# Patient Record
Sex: MALE | Race: WHITE | NOT HISPANIC OR LATINO | Employment: PART TIME | URBAN - METROPOLITAN AREA
[De-identification: names, ages, dates, MRNs, and addresses within clinical notes are randomized per-mention and may not be internally consistent; named-entity substitution may affect disease eponyms.]

---

## 2017-03-06 ENCOUNTER — TELEPHONE (OUTPATIENT)
Dept: CARDIOLOGY | Facility: CLINIC | Age: 67
End: 2017-03-06

## 2017-03-06 NOTE — TELEPHONE ENCOUNTER
Patient is scheduled for left hand palmar fasciectomy on 3/16/17 under IV sedation & axillary block and Dr. Juan Raomn Alvarez is requesting your recommendation on whether patient should hold 81mg Aspirin, if so for how long.    Please advise.  Thanks!    [facility]  Dr. Juan Ramon Alvarez  FAX # [phone].

## 2017-10-04 ENCOUNTER — OFFICE VISIT (OUTPATIENT)
Dept: CARDIOLOGY | Facility: CLINIC | Age: 67
End: 2017-10-04

## 2017-10-04 VITALS
SYSTOLIC BLOOD PRESSURE: 134 MMHG | WEIGHT: 226 LBS | DIASTOLIC BLOOD PRESSURE: 80 MMHG | HEIGHT: 74 IN | BODY MASS INDEX: 29 KG/M2 | HEART RATE: 74 BPM

## 2017-10-04 DIAGNOSIS — I73.9 PAD (PERIPHERAL ARTERY DISEASE) (HCC): Primary | ICD-10-CM

## 2017-10-04 DIAGNOSIS — I65.29 OCCLUSION OF CAROTID ARTERY, UNSPECIFIED LATERALITY: ICD-10-CM

## 2017-10-04 PROCEDURE — 93000 ELECTROCARDIOGRAM COMPLETE: CPT | Performed by: INTERNAL MEDICINE

## 2017-10-04 PROCEDURE — 99213 OFFICE O/P EST LOW 20 MIN: CPT | Performed by: INTERNAL MEDICINE

## 2017-10-04 NOTE — PROGRESS NOTES
Subjective:     Encounter Date:10/04/2017      Patient ID: Kevon Orantes is a 67 y.o. male.    Chief Complaint: PAF, carotid artery occlusion    History of Present Illness    Dear Dr. Moody,     I had the pleasure of seeing your patient in cardiac followup today.  As you well know, he is a alfredo 67-year-old man with history of coronary and peripheral arterial disease.  He is status post atherectomy of his left SFA.  He has had an occlusion of his left carotid.      He comes in for his yearly followup.  Since I have last seen him, he states his golf game is the best it has ever been.  He is shooting in the 70s.  This is despite having hand surgery for a contracture.      He denies any symptoms of a stroke, angina or claudication.          Review of Systems   All other systems reviewed and are negative.        ECG 12 Lead  Date/Time: 10/4/2017 9:33 AM  Performed by: ANIA MCCLOUD  Authorized by: ANIA MCCLOUD   Comparison: compared with previous ECG   Similar to previous ECG  Rhythm: sinus rhythm  BPM: 74                 Objective:     Physical Exam   Constitutional: He is oriented to person, place, and time. He appears well-developed and well-nourished.   HENT:   Head: Normocephalic and atraumatic.   Neck: Normal range of motion. Neck supple.   Cardiovascular: Normal rate, regular rhythm and normal heart sounds.    Pulmonary/Chest: Effort normal and breath sounds normal.   Abdominal: Soft. Bowel sounds are normal.   Musculoskeletal: Normal range of motion.   Neurological: He is alert and oriented to person, place, and time.   Skin: Skin is warm and dry.   Psychiatric: He has a normal mood and affect. His behavior is normal. Thought content normal.   Vitals reviewed.      Lab Review:       Assessment:          Diagnosis Plan   1. PAD (peripheral artery disease)     2. Occlusion of carotid artery, unspecified laterality            Plan:       It was a pleasure to see your patient in cardiac followup today.  He is stable  from the cardiac standpoint without any complaints of angina.  His peripheral arterial disease is stable.  He has no symptoms of carotid disease.  He will see me again in one year or sooner if symptoms warrant.      Coronary Artery Disease  Assessment  • The patient has no angina    Subjective - Objective  • Current antiplatelet therapy includes aspirin 81 mg

## 2018-10-10 ENCOUNTER — OFFICE VISIT (OUTPATIENT)
Dept: CARDIOLOGY | Facility: CLINIC | Age: 68
End: 2018-10-10

## 2018-10-10 VITALS
WEIGHT: 230 LBS | HEART RATE: 72 BPM | BODY MASS INDEX: 29.52 KG/M2 | HEIGHT: 74 IN | SYSTOLIC BLOOD PRESSURE: 120 MMHG | DIASTOLIC BLOOD PRESSURE: 66 MMHG

## 2018-10-10 DIAGNOSIS — I73.9 PAD (PERIPHERAL ARTERY DISEASE) (HCC): Primary | ICD-10-CM

## 2018-10-10 DIAGNOSIS — I65.29 OCCLUSION OF CAROTID ARTERY, UNSPECIFIED LATERALITY: ICD-10-CM

## 2018-10-10 PROCEDURE — 99213 OFFICE O/P EST LOW 20 MIN: CPT | Performed by: INTERNAL MEDICINE

## 2018-10-10 PROCEDURE — 93000 ELECTROCARDIOGRAM COMPLETE: CPT | Performed by: INTERNAL MEDICINE

## 2018-10-10 NOTE — PROGRESS NOTES
Subjective:     Encounter Date:10/10/2018      Patient ID: Kevon Orantes is a 68 y.o. male.    Chief Complaint: PAD, carotid artery occlusion    History of Present Illness    Dear Suri Power,     I had the pleasure of seeing Kevon Orantes in cardiac followup today.  As you well know, he is a alfredo 68-year-old male with history of peripheral arterial disease.  He has had atherectomy of his left SFA.  He has carotid artery stenosis.  His left carotid artery is known to be occluded.      Since I have last seen him, he reports doing great.  He has no complaints.  He has traveled to [place] and the [facility].  He has no symptoms of claudication or critical limb ischemia.  He has no stroke symptoms.  His biggest complaint is that he is having trouble getting his gout medicine.          Review of Systems   All other systems reviewed and are negative.        ECG 12 Lead  Date/Time: 10/10/2018 9:00 AM  Performed by: ANIA MCCLOUD  Authorized by: ANIA MCCLOUD   Comparison: compared with previous ECG   Similar to previous ECG  Rhythm: sinus rhythm  BPM: 72  Clinical impression: normal ECG               Objective:     Physical Exam   Constitutional: He is oriented to person, place, and time. He appears well-developed and well-nourished.   HENT:   Head: Normocephalic and atraumatic.   Neck: Normal range of motion. Neck supple.   Cardiovascular: Normal rate, regular rhythm and normal heart sounds.    Pulmonary/Chest: Effort normal and breath sounds normal.   Abdominal: Soft. Bowel sounds are normal.   Musculoskeletal: Normal range of motion.   Neurological: He is alert and oriented to person, place, and time.   Skin: Skin is warm and dry.   Psychiatric: He has a normal mood and affect. His behavior is normal. Thought content normal.   Vitals reviewed.      Lab Review:       Assessment:          Diagnosis Plan   1. PAD (peripheral artery disease) (CMS/[facility])     2. Occlusion of carotid artery, unspecified laterality            Plan:        It was a pleasure to see your patient in cardiac followup today.  He is doing very well from the standpoint of his peripheral arterial disease and carotid occlusion.  He is on a good preventative regimen.  He has had no symptoms.  He will see me again in one year or sooner if symptoms warrant.

## 2018-10-10 NOTE — PROGRESS NOTES
Subjective:     Encounter Date:10/10/2018      Patient ID: Kevon Orantes is a 68 y.o. male.    Chief Complaint:  History of Present Illness    {Common H&P Review Areas:44658}    ROS      ECG 12 Lead  Date/Time: 10/10/2018 8:59 AM  Performed by: ANIA MCCLOUD  Authorized by: ANIA MCCLOUD                  Objective:     Physical Exam    Lab Review:       Assessment:         No diagnosis found.       Plan:

## 2019-05-28 ENCOUNTER — PREP FOR SURGERY (OUTPATIENT)
Dept: OTHER | Facility: HOSPITAL | Age: 69
End: 2019-05-28

## 2019-05-28 DIAGNOSIS — K63.5 POLYP OF TRANSVERSE COLON, UNSPECIFIED TYPE: Primary | ICD-10-CM

## 2019-06-18 PROBLEM — K63.5 POLYP OF TRANSVERSE COLON: Status: ACTIVE | Noted: 2019-06-18

## 2019-08-26 RX ORDER — ISOSORBIDE MONONITRATE 30 MG/1
30 TABLET, EXTENDED RELEASE ORAL EVERY EVENING
Status: ON HOLD | COMMUNITY
End: 2019-10-29

## 2019-08-27 ENCOUNTER — ANESTHESIA EVENT (OUTPATIENT)
Dept: GASTROENTEROLOGY | Facility: HOSPITAL | Age: 69
End: 2019-08-27

## 2019-08-27 ENCOUNTER — HOSPITAL ENCOUNTER (OUTPATIENT)
Facility: HOSPITAL | Age: 69
Setting detail: HOSPITAL OUTPATIENT SURGERY
Discharge: HOME | End: 2019-08-27
Attending: INTERNAL MEDICINE | Admitting: INTERNAL MEDICINE

## 2019-08-27 ENCOUNTER — ANESTHESIA (OUTPATIENT)
Dept: GASTROENTEROLOGY | Facility: HOSPITAL | Age: 69
End: 2019-08-27

## 2019-08-27 VITALS
OXYGEN SATURATION: 93 % | DIASTOLIC BLOOD PRESSURE: 72 MMHG | TEMPERATURE: 98.1 F | BODY MASS INDEX: 28.88 KG/M2 | WEIGHT: 225 LBS | RESPIRATION RATE: 18 BRPM | SYSTOLIC BLOOD PRESSURE: 107 MMHG | HEIGHT: 74 IN | HEART RATE: 77 BPM

## 2019-08-27 LAB — GLUCOSE BLDC GLUCOMTR-MCNC: 97 MG/DL (ref 70–130)

## 2019-08-27 PROCEDURE — 82962 GLUCOSE BLOOD TEST: CPT

## 2019-08-27 PROCEDURE — G0105 COLORECTAL SCRN; HI RISK IND: HCPCS | Performed by: INTERNAL MEDICINE

## 2019-08-27 PROCEDURE — 25010000002 PROPOFOL 10 MG/ML EMULSION: Performed by: NURSE ANESTHETIST, CERTIFIED REGISTERED

## 2019-08-27 PROCEDURE — S0260 H&P FOR SURGERY: HCPCS | Performed by: INTERNAL MEDICINE

## 2019-08-27 RX ORDER — PROPOFOL 10 MG/ML
VIAL (ML) INTRAVENOUS AS NEEDED
Status: DISCONTINUED | OUTPATIENT
Start: 2019-08-27 | End: 2019-08-27 | Stop reason: SURG

## 2019-08-27 RX ORDER — SODIUM CHLORIDE, SODIUM LACTATE, POTASSIUM CHLORIDE, CALCIUM CHLORIDE 600; 310; 30; 20 MG/100ML; MG/100ML; MG/100ML; MG/100ML
30 INJECTION, SOLUTION INTRAVENOUS CONTINUOUS PRN
Status: DISCONTINUED | OUTPATIENT
Start: 2019-08-27 | End: 2019-08-27 | Stop reason: HOSPADM

## 2019-08-27 RX ORDER — PROPOFOL 10 MG/ML
VIAL (ML) INTRAVENOUS CONTINUOUS PRN
Status: DISCONTINUED | OUTPATIENT
Start: 2019-08-27 | End: 2019-08-27 | Stop reason: SURG

## 2019-08-27 RX ADMIN — SODIUM CHLORIDE, POTASSIUM CHLORIDE, SODIUM LACTATE AND CALCIUM CHLORIDE 30 ML/HR: 600; 310; 30; 20 INJECTION, SOLUTION INTRAVENOUS at 12:41

## 2019-08-27 RX ADMIN — PROPOFOL 50 MG: 10 INJECTION, EMULSION INTRAVENOUS at 13:07

## 2019-08-27 RX ADMIN — PROPOFOL 250 MCG/KG/MIN: 10 INJECTION, EMULSION INTRAVENOUS at 13:07

## 2019-08-27 NOTE — ANESTHESIA PREPROCEDURE EVALUATION
Anesthesia Evaluation     Patient summary reviewed and Nursing notes reviewed   no history of anesthetic complications:  NPO Solid Status: > 8 hours  NPO Liquid Status: > 8 hours           Airway   Mallampati: II  TM distance: >3 FB  Neck ROM: full  No difficulty expected  Dental - normal exam     Pulmonary    (+) a smoker Former Abstained day of surgery,   (-) COPD, asthma, sleep apnea, rhonchi, decreased breath sounds, wheezes  Cardiovascular   Exercise tolerance: good (4-7 METS)    Rhythm: regular  Rate: normal    (+) hypertension, PVD, hyperlipidemia,  carotid artery disease  (-) CAD, dysrhythmias, angina, LAMBERT, murmur      Neuro/Psych  (+) CVA,     (-) seizures    ROS Comment: CVA 2008 resolved after 4 days has an occluded carotid pt not sure which side  GI/Hepatic/Renal/Endo    (+)   diabetes mellitus type 2,   (-) liver disease, no renal disease, hypothyroidism, hyperthyroidism    Musculoskeletal     Abdominal     Abdomen: soft.   Substance History      OB/GYN          Other                        Anesthesia Plan    ASA 3     MAC   total IV anesthesia  intravenous induction   Anesthetic plan, all risks, benefits, and alternatives have been provided, discussed and informed consent has been obtained with: patient.

## 2019-08-27 NOTE — ANESTHESIA POSTPROCEDURE EVALUATION
"Patient: Kevon Orantes    Procedure Summary     Date:  08/27/19 Room / Location:  [facility] ENDOSCOPY 4 /  [facility]    Anesthesia Start:  1253 Anesthesia Stop:  1323    Procedure:  COLONOSCOPY to cecum into TI (N/A ) Diagnosis:       Polyp of transverse colon, unspecified type      (Polyp of transverse colon, unspecified type [D12.3])    Surgeon:  Angel Luis Velasquez MD Provider:  Peña Ortega MD    Anesthesia Type:  MAC ASA Status:  3          Anesthesia Type: MAC  Last vitals  BP   99/66 (08/27/19 1334)   Temp   36.7 °C (98.1 °F) (08/27/19 1233)   Pulse   80 (08/27/19 1334)   Resp   18 (08/27/19 1334)     SpO2   95 % (08/27/19 1334)     Post Anesthesia Care and Evaluation    Patient location during evaluation: bedside  Patient participation: complete - patient participated  Level of consciousness: awake and alert  Pain management: adequate  Airway patency: patent  Anesthetic complications: No anesthetic complications    Cardiovascular status: acceptable  Respiratory status: acceptable  Hydration status: acceptable    Comments: BP 99/66 (BP Location: Left arm, Patient Position: Lying)   Pulse 80   Temp 36.7 °C (98.1 °F) (Oral)   Resp 18   Ht 188 cm (74\")   Wt 102 kg (225 lb)   SpO2 95%   BMI 28.89 kg/m²           "

## 2019-08-27 NOTE — H&P
[facility]  Pre Procedure History & Physical    Chief Complaint:   Time for my colonoscopy    Subjective     HPI:   68 y.o. 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)    • Arteriolosclerosis    • Arthritis     KNEES   • CAD (coronary artery disease)    • Carotid artery stenosis    • Diabetes mellitus (CMS/HCC)     TYPE 2   • Edema     lower extrremity   • H/O cerebral artery stenosis    • Health care maintenance    • Hyperlipidemia    • Hypertension    • Intermittent claudication (CMS/HCC)    • Peripheral vascular disease (CMS/HCC)    • PVD (peripheral vascular disease) (CMS/[facility])    • Stroke syndrome     2008         Family History:  Family History   Problem Relation Age of Onset   • Malig Hyperthermia Neg Hx        Social History:   reports that he quit smoking about 11 years ago. His smoking use included cigarettes. He has never used smokeless tobacco. He reports that he drinks alcohol. He reports that he does not use drugs.    Medications:   Medications Prior to Admission   Medication Sig Dispense Refill Last Dose   • aspirin 81 MG EC tablet Take 81 mg by mouth Every Other Day.   8/26/2019 at Unknown time   • isosorbide mononitrate (IMDUR) 30 MG 24 hr tablet Take 30 mg by mouth Every Evening.   8/26/2019 at Unknown time   • metFORMIN (GLUCOPHAGE) 500 MG tablet Take 500 mg by mouth 2 (Two) Times a Day With Meals.   8/27/2019 at Unknown time   • simvastatin (ZOCOR) 20 MG tablet Take 20 mg by mouth Every Night.   8/27/2019 at Unknown time   • triamterene-hydrochlorothiazide (DYAZIDE) 37.5-25 MG per capsule Take 1 capsule by mouth Every Morning.   8/27/2019 at Unknown time   • colchicine 0.6 MG tablet Take 0.6 mg by mouth As Needed.   More than a month at Unknown time   • isosorbide mononitrate (IMDUR) 30 MG 24 hr tablet take 1 tablet by mouth once daily 90 tablet 3 Taking   • nitroglycerin (NITROSTAT) 0.4 MG SL  "tablet Place 0.4 mg under the tongue Every 5 (Five) Minutes As Needed for chest pain. Take no more than 3 doses in 15 minutes.   Unknown at Unknown time       Allergies:  Patient has no known allergies.    ROS:    Pertinent items are noted in HPI     Objective     Blood pressure 118/69, pulse 76, temperature 98.1 °F (36.7 °C), temperature source Oral, resp. rate 12, height 188 cm (74\"), weight 102 kg (225 lb), SpO2 94 %.    Physical Exam   Constitutional: Pt is oriented to person, place, and time and well-developed, well-nourished, and in no distress.   HENT:   Mouth/Throat: Oropharynx is clear and moist.   Neck: Normal range of motion. Neck supple.   Cardiovascular: Normal rate, regular rhythm and normal heart sounds.    Pulmonary/Chest: Effort normal and breath sounds normal. No respiratory distress. No  wheezes.   Abdominal: Soft. Bowel sounds are normal.   Skin: Skin is warm and dry.   Psychiatric: Mood, memory, affect and judgment normal.     Assessment/Plan     Diagnosis:  Encounter for screening for colon cancer    Anticipated Surgical Procedure:  Colonoscopy    The risks, benefits, and alternatives of this procedure have been discussed with the patient or the responsible party- the patient understands and agrees to proceed.                                                                "

## 2019-08-27 NOTE — DISCHARGE INSTRUCTIONS
For the next 24 hours patient needs to be with a responsible adult.    For 24 hours DO NOT drive, operate machinery, appliances, drink alcohol, make important decisions or sign legal documents.    Start with a light or bland diet if you are feeling sick to your stomach otherwise advance to regular diet as tolerated.    Follow recommendations on procedure report if provided by your doctor.    Call Dr. Velasquez for problems [phone]    Problems may include but not limited to: large amounts of bleeding, trouble breathing, repeated vomiting, severe unrelieved pain, fever or chills.

## 2019-10-10 ENCOUNTER — OFFICE VISIT (OUTPATIENT)
Dept: CARDIOLOGY | Facility: CLINIC | Age: 69
End: 2019-10-10

## 2019-10-10 VITALS
WEIGHT: 229.8 LBS | SYSTOLIC BLOOD PRESSURE: 110 MMHG | DIASTOLIC BLOOD PRESSURE: 72 MMHG | BODY MASS INDEX: 29.49 KG/M2 | HEIGHT: 74 IN | HEART RATE: 77 BPM

## 2019-10-10 DIAGNOSIS — I65.22 STENOSIS OF LEFT CAROTID ARTERY: Primary | ICD-10-CM

## 2019-10-10 DIAGNOSIS — I73.9 PVD (PERIPHERAL VASCULAR DISEASE) (HCC): ICD-10-CM

## 2019-10-10 PROBLEM — I65.29 CAROTID ARTERY STENOSIS: Status: ACTIVE | Noted: 2019-10-10

## 2019-10-10 PROCEDURE — 93000 ELECTROCARDIOGRAM COMPLETE: CPT | Performed by: PHYSICIAN ASSISTANT

## 2019-10-10 PROCEDURE — 99214 OFFICE O/P EST MOD 30 MIN: CPT | Performed by: PHYSICIAN ASSISTANT
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
## 2019-10-17 ENCOUNTER — HOSPITAL ENCOUNTER (OUTPATIENT)
Dept: CARDIOLOGY | Facility: HOSPITAL | Age: 69
Discharge: HOME | End: 2019-10-17
Admitting: PHYSICIAN ASSISTANT

## 2019-10-17 DIAGNOSIS — I65.22 STENOSIS OF LEFT CAROTID ARTERY: ICD-10-CM

## 2019-10-17 DIAGNOSIS — I73.9 PVD (PERIPHERAL VASCULAR DISEASE) (HCC): ICD-10-CM

## 2019-10-17 PROCEDURE — 93880 EXTRACRANIAL BILAT STUDY: CPT

## 2019-10-17 PROCEDURE — 93880 EXTRACRANIAL BILAT STUDY: CPT | Performed by: INTERNAL MEDICINE

## 2019-10-18 LAB
BH CV XLRA MEAS LEFT DIST CCA EDV: -10.5 CM/SEC
BH CV XLRA MEAS LEFT DIST CCA PSV: -38.7 CM/SEC
BH CV XLRA MEAS LEFT MID CCA EDV: 7.3 CM/SEC
BH CV XLRA MEAS LEFT MID CCA PSV: 51.3 CM/SEC
BH CV XLRA MEAS LEFT PROX CCA EDV: 7.3 CM/SEC
BH CV XLRA MEAS LEFT PROX CCA PSV: 44.6 CM/SEC
BH CV XLRA MEAS LEFT PROX ECA PSV: -167.1 CM/SEC
BH CV XLRA MEAS LEFT PROX SCLA PSV: 107.9 CM/SEC
BH CV XLRA MEAS LEFT VERTEBRAL A PSV: -32.8 CM/SEC
BH CV XLRA MEAS RIGHT DIST CCA EDV: 26.7 CM/SEC
BH CV XLRA MEAS RIGHT DIST CCA PSV: 87 CM/SEC
BH CV XLRA MEAS RIGHT DIST ICA EDV: -31 CM/SEC
BH CV XLRA MEAS RIGHT DIST ICA PSV: -71.1 CM/SEC
BH CV XLRA MEAS RIGHT ICA/CCA RATIO: 0.91
BH CV XLRA MEAS RIGHT MID CCA EDV: 25.5 CM/SEC
BH CV XLRA MEAS RIGHT MID CCA PSV: 83.2 CM/SEC
BH CV XLRA MEAS RIGHT MID ICA EDV: -33.1 CM/SEC
BH CV XLRA MEAS RIGHT MID ICA PSV: -82.8 CM/SEC
BH CV XLRA MEAS RIGHT PROX CCA EDV: 28.6 CM/SEC
BH CV XLRA MEAS RIGHT PROX CCA PSV: 91.9 CM/SEC
BH CV XLRA MEAS RIGHT PROX ECA PSV: -109.5 CM/SEC
BH CV XLRA MEAS RIGHT PROX ICA EDV: -29.9 CM/SEC
BH CV XLRA MEAS RIGHT PROX ICA PSV: -79.6 CM/SEC
BH CV XLRA MEAS RIGHT PROX SCLA PSV: 63.8 CM/SEC
BH CV XLRA MEAS RIGHT VERTEBRAL A PSV: -36.1 CM/SEC

## 2019-10-22 ENCOUNTER — TELEPHONE (OUTPATIENT)
Dept: CARDIOLOGY | Facility: CLINIC | Age: 69
End: 2019-10-22

## 2019-10-22 NOTE — TELEPHONE ENCOUNTER
I informed him of the results of his carotid ultrasound.  He is asymptomatic and on good medical therapy.  Continue current medical regimen.

## 2019-10-22 NOTE — TELEPHONE ENCOUNTER
----- Message from Rashid Lucas MD sent at 10/22/2019  7:30 AM EDT -----  Doesn't seem like he has anything new unless I am missing something.   I don't think he needs them now.     ----- Message -----  From: Magaly Junior PA  Sent: 10/21/2019   9:17 AM  To: SHRUTHI Hernandez, Rashid Lucas MD    Donnie, this is a patient of Jayson's who he follows for peripheral arterial disease.  I saw him for follow-up last month and he had not had any carotid imaging in quite some time.  His left carotid is known to be occluded.  He is asymptomatic although he did have a stroke in 2008.  He is on a baby aspirin daily as well as a statin.  He is going to be seeing you next year as a transference of care.  Would you like me to get him in to see the vascular guys?    P  ----- Message -----  From: Ian Peterson MD  Sent: 10/18/2019   4:39 PM  To: SHRUTHI Hernandez

## 2019-10-29 ENCOUNTER — APPOINTMENT (OUTPATIENT)
Dept: MRI IMAGING | Facility: HOSPITAL | Age: 69
End: 2019-10-29

## 2019-10-29 ENCOUNTER — APPOINTMENT (OUTPATIENT)
Dept: CT IMAGING | Facility: HOSPITAL | Age: 69
End: 2019-10-29

## 2019-10-29 ENCOUNTER — APPOINTMENT (OUTPATIENT)
Dept: CARDIOLOGY | Facility: HOSPITAL | Age: 69
End: 2019-10-29

## 2019-10-29 ENCOUNTER — APPOINTMENT (OUTPATIENT)
Dept: GENERAL RADIOLOGY | Facility: HOSPITAL | Age: 69
End: 2019-10-29

## 2019-10-29 ENCOUNTER — HOSPITAL ENCOUNTER (OUTPATIENT)
Facility: HOSPITAL | Age: 69
Setting detail: OBSERVATION
Discharge: HOME | End: 2019-10-30
Attending: EMERGENCY MEDICINE | Admitting: EMERGENCY MEDICINE

## 2019-10-29 DIAGNOSIS — Z86.39 HISTORY OF HYPERLIPIDEMIA: ICD-10-CM

## 2019-10-29 DIAGNOSIS — H53.132 ACUTE LOSS OF VISION, LEFT: Primary | ICD-10-CM

## 2019-10-29 DIAGNOSIS — Z86.73 HISTORY OF CVA (CEREBROVASCULAR ACCIDENT): ICD-10-CM

## 2019-10-29 DIAGNOSIS — Z86.79 HISTORY OF HYPERTENSION: ICD-10-CM

## 2019-10-29 PROBLEM — I25.10 CAD (CORONARY ARTERY DISEASE): Status: ACTIVE | Noted: 2019-10-29

## 2019-10-29 PROBLEM — E78.5 HYPERLIPIDEMIA: Status: ACTIVE | Noted: 2019-10-29

## 2019-10-29 PROBLEM — R60.9 EDEMA: Status: ACTIVE | Noted: 2019-10-29

## 2019-10-29 PROBLEM — I10 HYPERTENSION: Status: ACTIVE | Noted: 2019-10-29

## 2019-10-29 PROBLEM — E11.9 DIABETES MELLITUS (HCC): Status: ACTIVE | Noted: 2019-10-29

## 2019-10-29 LAB
ALBUMIN SERPL-MCNC: 4.1 G/DL (ref 3.5–5.2)
ALBUMIN/GLOB SERPL: 1.1 G/DL
ALP SERPL-CCNC: 78 U/L (ref 39–117)
ALT SERPL W P-5'-P-CCNC: 26 U/L (ref 1–41)
ANION GAP SERPL CALCULATED.3IONS-SCNC: 14 MMOL/L (ref 5–15)
AORTIC DIMENSIONLESS INDEX: 0.8 (DI)
AST SERPL-CCNC: 21 U/L (ref 1–40)
BASOPHILS # BLD AUTO: 0.06 10*3/MM3 (ref 0–0.2)
BASOPHILS NFR BLD AUTO: 0.6 % (ref 0–1.5)
BH CV ECHO MEAS - AO MAX PG (FULL): 0.73 MMHG
BH CV ECHO MEAS - AO MAX PG: 2.9 MMHG
BH CV ECHO MEAS - AO MEAN PG (FULL): 1 MMHG
BH CV ECHO MEAS - AO MEAN PG: 2 MMHG
BH CV ECHO MEAS - AO ROOT AREA (BSA CORRECTED): 1.3
BH CV ECHO MEAS - AO ROOT AREA: 7.1 CM^2
BH CV ECHO MEAS - AO ROOT DIAM: 3 CM
BH CV ECHO MEAS - AO V2 MAX: 85.4 CM/SEC
BH CV ECHO MEAS - AO V2 MEAN: 58.9 CM/SEC
BH CV ECHO MEAS - AO V2 VTI: 19.4 CM
BH CV ECHO MEAS - AVA(I,A): 3.1 CM^2
BH CV ECHO MEAS - AVA(I,D): 3.1 CM^2
BH CV ECHO MEAS - AVA(V,A): 3.3 CM^2
BH CV ECHO MEAS - AVA(V,D): 3.3 CM^2
BH CV ECHO MEAS - BSA(HAYCOCK): 2.4 M^2
BH CV ECHO MEAS - BSA: 2.3 M^2
BH CV ECHO MEAS - BZI_BMI: 29.7 KILOGRAMS/M^2
BH CV ECHO MEAS - BZI_METRIC_HEIGHT: 188 CM
BH CV ECHO MEAS - BZI_METRIC_WEIGHT: 104.8 KG
BH CV ECHO MEAS - EDV(CUBED): 79.5 ML
BH CV ECHO MEAS - EDV(MOD-SP2): 84 ML
BH CV ECHO MEAS - EDV(MOD-SP4): 127 ML
BH CV ECHO MEAS - EDV(TEICH): 83.1 ML
BH CV ECHO MEAS - EF(CUBED): 72.4 %
BH CV ECHO MEAS - EF(MOD-BP): 56 %
BH CV ECHO MEAS - EF(MOD-SP2): 59.5 %
BH CV ECHO MEAS - EF(MOD-SP4): 51.2 %
BH CV ECHO MEAS - EF(TEICH): 64.4 %
BH CV ECHO MEAS - ESV(CUBED): 22 ML
BH CV ECHO MEAS - ESV(MOD-SP2): 34 ML
BH CV ECHO MEAS - ESV(MOD-SP4): 62 ML
BH CV ECHO MEAS - ESV(TEICH): 29.6 ML
BH CV ECHO MEAS - FS: 34.9 %
BH CV ECHO MEAS - IVS/LVPW: 1
BH CV ECHO MEAS - IVSD: 1 CM
BH CV ECHO MEAS - LAT PEAK E' VEL: 8 CM/SEC
BH CV ECHO MEAS - LV DIASTOLIC VOL/BSA (35-75): 55 ML/M^2
BH CV ECHO MEAS - LV MASS(C)D: 142.5 GRAMS
BH CV ECHO MEAS - LV MASS(C)DI: 61.7 GRAMS/M^2
BH CV ECHO MEAS - LV MAX PG: 2.2 MMHG
BH CV ECHO MEAS - LV MEAN PG: 1 MMHG
BH CV ECHO MEAS - LV SYSTOLIC VOL/BSA (12-30): 26.8 ML/M^2
BH CV ECHO MEAS - LV V1 MAX: 73.9 CM/SEC
BH CV ECHO MEAS - LV V1 MEAN: 50.1 CM/SEC
BH CV ECHO MEAS - LV V1 VTI: 15.6 CM
BH CV ECHO MEAS - LVIDD: 4.3 CM
BH CV ECHO MEAS - LVIDS: 2.8 CM
BH CV ECHO MEAS - LVLD AP2: 7.4 CM
BH CV ECHO MEAS - LVLD AP4: 7.7 CM
BH CV ECHO MEAS - LVLS AP2: 6.3 CM
BH CV ECHO MEAS - LVLS AP4: 6.6 CM
BH CV ECHO MEAS - LVOT AREA (M): 3.8 CM^2
BH CV ECHO MEAS - LVOT AREA: 3.8 CM^2
BH CV ECHO MEAS - LVOT DIAM: 2.2 CM
BH CV ECHO MEAS - LVPWD: 1 CM
BH CV ECHO MEAS - MED PEAK E' VEL: 6 CM/SEC
BH CV ECHO MEAS - MV A DUR: 0.12 SEC
BH CV ECHO MEAS - MV A MAX VEL: 84.9 CM/SEC
BH CV ECHO MEAS - MV DEC SLOPE: 154.5 CM/SEC^2
BH CV ECHO MEAS - MV DEC TIME: 324 SEC
BH CV ECHO MEAS - MV E MAX VEL: 48.5 CM/SEC
BH CV ECHO MEAS - MV E/A: 0.57
BH CV ECHO MEAS - MV MAX PG: 3.1 MMHG
BH CV ECHO MEAS - MV MEAN PG: 1 MMHG
BH CV ECHO MEAS - MV P1/2T MAX VEL: 57.7 CM/SEC
BH CV ECHO MEAS - MV P1/2T: 109.4 MSEC
BH CV ECHO MEAS - MV V2 MAX: 87.5 CM/SEC
BH CV ECHO MEAS - MV V2 MEAN: 56.5 CM/SEC
BH CV ECHO MEAS - MV V2 VTI: 22.8 CM
BH CV ECHO MEAS - MVA P1/2T LCG: 3.8 CM^2
BH CV ECHO MEAS - MVA(P1/2T): 2 CM^2
BH CV ECHO MEAS - MVA(VTI): 2.6 CM^2
BH CV ECHO MEAS - RAP SYSTOLE: 8 MMHG
BH CV ECHO MEAS - SI(AO): 59.4 ML/M^2
BH CV ECHO MEAS - SI(CUBED): 24.9 ML/M^2
BH CV ECHO MEAS - SI(LVOT): 25.7 ML/M^2
BH CV ECHO MEAS - SI(MOD-SP2): 21.6 ML/M^2
BH CV ECHO MEAS - SI(MOD-SP4): 28.1 ML/M^2
BH CV ECHO MEAS - SI(TEICH): 23.2 ML/M^2
BH CV ECHO MEAS - SV(AO): 137.1 ML
BH CV ECHO MEAS - SV(CUBED): 57.6 ML
BH CV ECHO MEAS - SV(LVOT): 59.3 ML
BH CV ECHO MEAS - SV(MOD-SP2): 50 ML
BH CV ECHO MEAS - SV(MOD-SP4): 65 ML
BH CV ECHO MEAS - SV(TEICH): 53.5 ML
BH CV ECHO MEAS - TAPSE (>1.6): 1.9 CM2
BH CV ECHO MEASUREMENTS AVERAGE E/E' RATIO: 6.93
BH CV XLRA - RV BASE: 3 CM
BH CV XLRA - TDI S': 11 CM/SEC
BILIRUB SERPL-MCNC: 0.4 MG/DL (ref 0.2–1.2)
BILIRUB UR QL STRIP: NEGATIVE
BUN BLD-MCNC: 20 MG/DL (ref 8–23)
BUN/CREAT SERPL: 19.8 (ref 7–25)
CALCIUM SPEC-SCNC: 9.3 MG/DL (ref 8.6–10.5)
CHLORIDE SERPL-SCNC: 102 MMOL/L (ref 98–107)
CLARITY UR: CLEAR
CO2 SERPL-SCNC: 26 MMOL/L (ref 22–29)
COLOR UR: YELLOW
CREAT BLD-MCNC: 1.01 MG/DL (ref 0.76–1.27)
CRP SERPL-MCNC: 0.42 MG/DL (ref 0–0.5)
DEPRECATED RDW RBC AUTO: 43.9 FL (ref 37–54)
EOSINOPHIL # BLD AUTO: 0.58 10*3/MM3 (ref 0–0.4)
EOSINOPHIL NFR BLD AUTO: 6.2 % (ref 0.3–6.2)
ERYTHROCYTE [DISTWIDTH] IN BLOOD BY AUTOMATED COUNT: 12.7 % (ref 12.3–15.4)
ERYTHROCYTE [SEDIMENTATION RATE] IN BLOOD: 11 MM/HR (ref 0–20)
GFR SERPL CREATININE-BSD FRML MDRD: 73 ML/MIN/1.73
GLOBULIN UR ELPH-MCNC: 3.7 GM/DL
GLUCOSE BLD-MCNC: 158 MG/DL (ref 65–99)
GLUCOSE BLDC GLUCOMTR-MCNC: 112 MG/DL (ref 70–130)
GLUCOSE BLDC GLUCOMTR-MCNC: 145 MG/DL (ref 70–130)
GLUCOSE BLDC GLUCOMTR-MCNC: 162 MG/DL (ref 70–130)
GLUCOSE UR STRIP-MCNC: NEGATIVE MG/DL
HCT VFR BLD AUTO: 47.3 % (ref 37.5–51)
HGB BLD-MCNC: 15.8 G/DL (ref 13–17.7)
HGB UR QL STRIP.AUTO: NEGATIVE
IMM GRANULOCYTES # BLD AUTO: 0.04 10*3/MM3 (ref 0–0.05)
IMM GRANULOCYTES NFR BLD AUTO: 0.4 % (ref 0–0.5)
INR PPP: 1.07 (ref 0.9–1.1)
KETONES UR QL STRIP: NEGATIVE
LEFT ATRIUM VOLUME INDEX: 13 ML/M2
LEUKOCYTE ESTERASE UR QL STRIP.AUTO: NEGATIVE
LV EF 2D ECHO EST: 56 %
LYMPHOCYTES # BLD AUTO: 2.51 10*3/MM3 (ref 0.7–3.1)
LYMPHOCYTES NFR BLD AUTO: 26.9 % (ref 19.6–45.3)
MAGNESIUM SERPL-MCNC: 2.1 MG/DL (ref 1.6–2.4)
MCH RBC QN AUTO: 31.5 PG (ref 26.6–33)
MCHC RBC AUTO-ENTMCNC: 33.4 G/DL (ref 31.5–35.7)
MCV RBC AUTO: 94.2 FL (ref 79–97)
MONOCYTES # BLD AUTO: 0.75 10*3/MM3 (ref 0.1–0.9)
MONOCYTES NFR BLD AUTO: 8 % (ref 5–12)
NEUTROPHILS # BLD AUTO: 5.38 10*3/MM3 (ref 1.7–7)
NEUTROPHILS NFR BLD AUTO: 57.9 % (ref 42.7–76)
NITRITE UR QL STRIP: NEGATIVE
NRBC BLD AUTO-RTO: 0 /100 WBC (ref 0–0.2)
PH UR STRIP.AUTO: 7.5 [PH] (ref 5–8)
PLATELET # BLD AUTO: 197 10*3/MM3 (ref 140–450)
PMV BLD AUTO: 12.1 FL (ref 6–12)
POTASSIUM BLD-SCNC: 3.9 MMOL/L (ref 3.5–5.2)
PROT SERPL-MCNC: 7.8 G/DL (ref 6–8.5)
PROT UR QL STRIP: NEGATIVE
PROTHROMBIN TIME: 13.6 SECONDS (ref 11.7–14.2)
RBC # BLD AUTO: 5.02 10*6/MM3 (ref 4.14–5.8)
SODIUM BLD-SCNC: 142 MMOL/L (ref 136–145)
SP GR UR STRIP: 1.02 (ref 1–1.03)
TROPONIN T SERPL-MCNC: <0.01 NG/ML (ref 0–0.03)
UROBILINOGEN UR QL STRIP: NORMAL
WBC NRBC COR # BLD: 9.32 10*3/MM3 (ref 3.4–10.8)

## 2019-10-29 PROCEDURE — 70551 MRI BRAIN STEM W/O DYE: CPT

## 2019-10-29 PROCEDURE — 85610 PROTHROMBIN TIME: CPT | Performed by: EMERGENCY MEDICINE

## 2019-10-29 PROCEDURE — G0378 HOSPITAL OBSERVATION PER HR: HCPCS

## 2019-10-29 PROCEDURE — 93005 ELECTROCARDIOGRAM TRACING: CPT | Performed by: EMERGENCY MEDICINE

## 2019-10-29 PROCEDURE — 70496 CT ANGIOGRAPHY HEAD: CPT

## 2019-10-29 PROCEDURE — 96374 THER/PROPH/DIAG INJ IV PUSH: CPT

## 2019-10-29 PROCEDURE — 70498 CT ANGIOGRAPHY NECK: CPT

## 2019-10-29 PROCEDURE — 82962 GLUCOSE BLOOD TEST: CPT

## 2019-10-29 PROCEDURE — 80053 COMPREHEN METABOLIC PANEL: CPT | Performed by: EMERGENCY MEDICINE

## 2019-10-29 PROCEDURE — 86140 C-REACTIVE PROTEIN: CPT | Performed by: PSYCHIATRY & NEUROLOGY

## 2019-10-29 PROCEDURE — 25010000002 PERFLUTREN (DEFINITY) 8.476 MG IN SODIUM CHLORIDE 0.9 % 10 ML INJECTION: Performed by: NURSE PRACTITIONER

## 2019-10-29 PROCEDURE — 93010 ELECTROCARDIOGRAM REPORT: CPT | Performed by: INTERNAL MEDICINE

## 2019-10-29 PROCEDURE — 25010000002 IOPAMIDOL 61 % SOLUTION: Performed by: HOSPITALIST

## 2019-10-29 PROCEDURE — 93306 TTE W/DOPPLER COMPLETE: CPT

## 2019-10-29 PROCEDURE — 85025 COMPLETE CBC W/AUTO DIFF WBC: CPT | Performed by: EMERGENCY MEDICINE

## 2019-10-29 PROCEDURE — 71045 X-RAY EXAM CHEST 1 VIEW: CPT

## 2019-10-29 PROCEDURE — 84484 ASSAY OF TROPONIN QUANT: CPT | Performed by: EMERGENCY MEDICINE

## 2019-10-29 PROCEDURE — 85652 RBC SED RATE AUTOMATED: CPT | Performed by: PSYCHIATRY & NEUROLOGY

## 2019-10-29 PROCEDURE — 93306 TTE W/DOPPLER COMPLETE: CPT | Performed by: INTERNAL MEDICINE

## 2019-10-29 PROCEDURE — 25010000002 LORAZEPAM PER 2 MG: Performed by: PSYCHIATRY & NEUROLOGY

## 2019-10-29 PROCEDURE — 99205 OFFICE O/P NEW HI 60 MIN: CPT | Performed by: PSYCHIATRY & NEUROLOGY

## 2019-10-29 PROCEDURE — 83735 ASSAY OF MAGNESIUM: CPT | Performed by: EMERGENCY MEDICINE

## 2019-10-29 PROCEDURE — 81003 URINALYSIS AUTO W/O SCOPE: CPT | Performed by: EMERGENCY MEDICINE

## 2019-10-29 PROCEDURE — 70450 CT HEAD/BRAIN W/O DYE: CPT

## 2019-10-29 PROCEDURE — 99284 EMERGENCY DEPT VISIT MOD MDM: CPT

## 2019-10-29 RX ORDER — SODIUM CHLORIDE 9 MG/ML
75 INJECTION, SOLUTION INTRAVENOUS CONTINUOUS
Status: DISCONTINUED | OUTPATIENT
Start: 2019-10-29 | End: 2019-10-30 | Stop reason: HOSPADM

## 2019-10-29 RX ORDER — SODIUM CHLORIDE 0.9 % (FLUSH) 0.9 %
10 SYRINGE (ML) INJECTION EVERY 12 HOURS SCHEDULED
Status: DISCONTINUED | OUTPATIENT
Start: 2019-10-29 | End: 2019-10-30 | Stop reason: HOSPADM

## 2019-10-29 RX ORDER — ACETAMINOPHEN 650 MG/1
650 SUPPOSITORY RECTAL EVERY 4 HOURS PRN
Status: DISCONTINUED | OUTPATIENT
Start: 2019-10-29 | End: 2019-10-30 | Stop reason: HOSPADM

## 2019-10-29 RX ORDER — ASPIRIN 325 MG
325 TABLET ORAL DAILY
Status: DISCONTINUED | OUTPATIENT
Start: 2019-10-29 | End: 2019-10-29

## 2019-10-29 RX ORDER — ACETAMINOPHEN 160 MG/5ML
650 SOLUTION ORAL EVERY 4 HOURS PRN
Status: DISCONTINUED | OUTPATIENT
Start: 2019-10-29 | End: 2019-10-30 | Stop reason: HOSPADM

## 2019-10-29 RX ORDER — ASPIRIN 300 MG/1
300 SUPPOSITORY RECTAL DAILY
Status: DISCONTINUED | OUTPATIENT
Start: 2019-10-30 | End: 2019-10-30

## 2019-10-29 RX ORDER — LORAZEPAM 2 MG/ML
1 INJECTION INTRAMUSCULAR ONCE
Status: COMPLETED | OUTPATIENT
Start: 2019-10-29 | End: 2019-10-29

## 2019-10-29 RX ORDER — ASPIRIN 325 MG
325 TABLET ORAL DAILY
Status: DISCONTINUED | OUTPATIENT
Start: 2019-10-30 | End: 2019-10-30

## 2019-10-29 RX ORDER — ONDANSETRON 2 MG/ML
4 INJECTION INTRAMUSCULAR; INTRAVENOUS EVERY 4 HOURS PRN
Status: DISCONTINUED | OUTPATIENT
Start: 2019-10-29 | End: 2019-10-30 | Stop reason: HOSPADM

## 2019-10-29 RX ORDER — ACETAMINOPHEN 325 MG/1
650 TABLET ORAL EVERY 4 HOURS PRN
Status: DISCONTINUED | OUTPATIENT
Start: 2019-10-29 | End: 2019-10-30 | Stop reason: HOSPADM

## 2019-10-29 RX ORDER — SODIUM CHLORIDE 0.9 % (FLUSH) 0.9 %
10 SYRINGE (ML) INJECTION AS NEEDED
Status: DISCONTINUED | OUTPATIENT
Start: 2019-10-29 | End: 2019-10-30 | Stop reason: HOSPADM

## 2019-10-29 RX ORDER — ACETAMINOPHEN 325 MG/1
650 TABLET ORAL EVERY 6 HOURS PRN
Status: DISCONTINUED | OUTPATIENT
Start: 2019-10-29 | End: 2019-10-29 | Stop reason: SDUPTHER

## 2019-10-29 RX ORDER — ISOSORBIDE MONONITRATE 30 MG/1
30 TABLET, EXTENDED RELEASE ORAL DAILY
Status: DISCONTINUED | OUTPATIENT
Start: 2019-10-30 | End: 2019-10-30 | Stop reason: HOSPADM

## 2019-10-29 RX ORDER — ATORVASTATIN CALCIUM 80 MG/1
80 TABLET, FILM COATED ORAL NIGHTLY
Status: DISCONTINUED | OUTPATIENT
Start: 2019-10-29 | End: 2019-10-30

## 2019-10-29 RX ORDER — ONDANSETRON 2 MG/ML
4 INJECTION INTRAMUSCULAR; INTRAVENOUS EVERY 6 HOURS PRN
Status: DISCONTINUED | OUTPATIENT
Start: 2019-10-29 | End: 2019-10-30 | Stop reason: HOSPADM

## 2019-10-29 RX ORDER — ASPIRIN 300 MG/1
300 SUPPOSITORY RECTAL DAILY
Status: DISCONTINUED | OUTPATIENT
Start: 2019-10-29 | End: 2019-10-29

## 2019-10-29 RX ORDER — ASPIRIN 325 MG
325 TABLET ORAL ONCE
Status: COMPLETED | OUTPATIENT
Start: 2019-10-29 | End: 2019-10-29

## 2019-10-29 RX ORDER — BISACODYL 10 MG
10 SUPPOSITORY, RECTAL RECTAL DAILY PRN
Status: DISCONTINUED | OUTPATIENT
Start: 2019-10-29 | End: 2019-10-30 | Stop reason: HOSPADM

## 2019-10-29 RX ORDER — DEXTROSE MONOHYDRATE 25 G/50ML
25 INJECTION, SOLUTION INTRAVENOUS
Status: DISCONTINUED | OUTPATIENT
Start: 2019-10-29 | End: 2019-10-30 | Stop reason: HOSPADM

## 2019-10-29 RX ORDER — NICOTINE POLACRILEX 4 MG
15 LOZENGE BUCCAL
Status: DISCONTINUED | OUTPATIENT
Start: 2019-10-29 | End: 2019-10-30 | Stop reason: HOSPADM

## 2019-10-29 RX ADMIN — SODIUM CHLORIDE 75 ML/HR: 9 INJECTION, SOLUTION INTRAVENOUS at 14:00

## 2019-10-29 RX ADMIN — SODIUM CHLORIDE, PRESERVATIVE FREE 10 ML: 5 INJECTION INTRAVENOUS at 13:59

## 2019-10-29 RX ADMIN — IOPAMIDOL 95 ML: 612 INJECTION, SOLUTION INTRAVENOUS at 16:20

## 2019-10-29 RX ADMIN — ASPIRIN 325 MG: 325 TABLET ORAL at 13:59

## 2019-10-29 RX ADMIN — PERFLUTREN 3 ML: 6.52 INJECTION, SUSPENSION INTRAVENOUS at 10:45

## 2019-10-29 RX ADMIN — LORAZEPAM 1 MG: 2 INJECTION INTRAMUSCULAR; INTRAVENOUS at 22:37

## 2019-10-30 VITALS
SYSTOLIC BLOOD PRESSURE: 159 MMHG | HEART RATE: 74 BPM | RESPIRATION RATE: 18 BRPM | BODY MASS INDEX: 29.65 KG/M2 | TEMPERATURE: 97.1 F | HEIGHT: 74 IN | WEIGHT: 231 LBS | DIASTOLIC BLOOD PRESSURE: 96 MMHG | OXYGEN SATURATION: 95 %

## 2019-10-30 PROBLEM — H34.12: Status: ACTIVE | Noted: 2019-10-30

## 2019-10-30 LAB
ALBUMIN SERPL-MCNC: 3.7 G/DL (ref 3.5–5.2)
ALBUMIN/GLOB SERPL: 1.1 G/DL
ALP SERPL-CCNC: 70 U/L (ref 39–117)
ALT SERPL W P-5'-P-CCNC: 16 U/L (ref 1–41)
ANION GAP SERPL CALCULATED.3IONS-SCNC: 7.7 MMOL/L (ref 5–15)
AST SERPL-CCNC: 18 U/L (ref 1–40)
BILIRUB SERPL-MCNC: 0.5 MG/DL (ref 0.2–1.2)
BUN BLD-MCNC: 16 MG/DL (ref 8–23)
BUN/CREAT SERPL: 17.8 (ref 7–25)
CALCIUM SPEC-SCNC: 8.6 MG/DL (ref 8.6–10.5)
CHLORIDE SERPL-SCNC: 102 MMOL/L (ref 98–107)
CHOLEST SERPL-MCNC: 106 MG/DL (ref 0–200)
CO2 SERPL-SCNC: 29.3 MMOL/L (ref 22–29)
CREAT BLD-MCNC: 0.9 MG/DL (ref 0.76–1.27)
DEPRECATED RDW RBC AUTO: 41.8 FL (ref 37–54)
ERYTHROCYTE [DISTWIDTH] IN BLOOD BY AUTOMATED COUNT: 12.7 % (ref 12.3–15.4)
ERYTHROCYTE [SEDIMENTATION RATE] IN BLOOD: 9 MM/HR (ref 0–20)
GFR SERPL CREATININE-BSD FRML MDRD: 83 ML/MIN/1.73
GLOBULIN UR ELPH-MCNC: 3.4 GM/DL
GLUCOSE BLD-MCNC: 128 MG/DL (ref 65–99)
GLUCOSE BLDC GLUCOMTR-MCNC: 121 MG/DL (ref 70–130)
GLUCOSE BLDC GLUCOMTR-MCNC: 149 MG/DL (ref 70–130)
HBA1C MFR BLD: 6.2 % (ref 4.8–5.6)
HCT VFR BLD AUTO: 43.8 % (ref 37.5–51)
HDLC SERPL-MCNC: 49 MG/DL (ref 40–60)
HGB BLD-MCNC: 14.9 G/DL (ref 13–17.7)
LDLC SERPL CALC-MCNC: 39 MG/DL (ref 0–100)
LDLC/HDLC SERPL: 0.79 {RATIO}
MCH RBC QN AUTO: 31.2 PG (ref 26.6–33)
MCHC RBC AUTO-ENTMCNC: 34 G/DL (ref 31.5–35.7)
MCV RBC AUTO: 91.6 FL (ref 79–97)
PLATELET # BLD AUTO: 214 10*3/MM3 (ref 140–450)
PMV BLD AUTO: 11.6 FL (ref 6–12)
POTASSIUM BLD-SCNC: 3.6 MMOL/L (ref 3.5–5.2)
PROT SERPL-MCNC: 7.1 G/DL (ref 6–8.5)
RBC # BLD AUTO: 4.78 10*6/MM3 (ref 4.14–5.8)
SODIUM BLD-SCNC: 139 MMOL/L (ref 136–145)
TRIGL SERPL-MCNC: 92 MG/DL (ref 0–150)
TSH SERPL DL<=0.05 MIU/L-ACNC: 2.13 UIU/ML (ref 0.27–4.2)
VLDLC SERPL-MCNC: 18.4 MG/DL (ref 5–40)
WBC NRBC COR # BLD: 7.61 10*3/MM3 (ref 3.4–10.8)

## 2019-10-30 PROCEDURE — 80053 COMPREHEN METABOLIC PANEL: CPT | Performed by: NURSE PRACTITIONER

## 2019-10-30 PROCEDURE — 82962 GLUCOSE BLOOD TEST: CPT

## 2019-10-30 PROCEDURE — 84443 ASSAY THYROID STIM HORMONE: CPT | Performed by: NURSE PRACTITIONER

## 2019-10-30 PROCEDURE — G0378 HOSPITAL OBSERVATION PER HR: HCPCS

## 2019-10-30 PROCEDURE — 83036 HEMOGLOBIN GLYCOSYLATED A1C: CPT | Performed by: NURSE PRACTITIONER

## 2019-10-30 PROCEDURE — 85652 RBC SED RATE AUTOMATED: CPT | Performed by: HOSPITALIST

## 2019-10-30 PROCEDURE — 85027 COMPLETE CBC AUTOMATED: CPT | Performed by: NURSE PRACTITIONER

## 2019-10-30 PROCEDURE — 36415 COLL VENOUS BLD VENIPUNCTURE: CPT | Performed by: NURSE PRACTITIONER

## 2019-10-30 PROCEDURE — 80061 LIPID PANEL: CPT | Performed by: NURSE PRACTITIONER

## 2019-10-30 PROCEDURE — 99214 OFFICE O/P EST MOD 30 MIN: CPT | Performed by: NURSE PRACTITIONER

## 2019-10-30 RX ORDER — ATORVASTATIN CALCIUM 40 MG/1
40 TABLET, FILM COATED ORAL NIGHTLY
Qty: 30 TABLET | Refills: 0 | Status: ON HOLD | OUTPATIENT
Start: 2019-10-30 | End: 2019-11-18

## 2019-10-30 RX ORDER — ATORVASTATIN CALCIUM 20 MG/1
40 TABLET, FILM COATED ORAL NIGHTLY
Status: DISCONTINUED | OUTPATIENT
Start: 2019-10-30 | End: 2019-10-30 | Stop reason: HOSPADM

## 2019-10-30 RX ADMIN — SODIUM CHLORIDE, PRESERVATIVE FREE 10 ML: 5 INJECTION INTRAVENOUS at 08:23

## 2019-10-30 RX ADMIN — ASPIRIN 325 MG: 325 TABLET ORAL at 08:21

## 2019-10-30 RX ADMIN — ISOSORBIDE MONONITRATE 30 MG: 30 TABLET ORAL at 08:16

## 2019-11-08 ENCOUNTER — TELEPHONE (OUTPATIENT)
Dept: NEUROSURGERY | Facility: CLINIC | Age: 69
End: 2019-11-08

## 2019-11-08 ENCOUNTER — PREP FOR SURGERY (OUTPATIENT)
Dept: OTHER | Facility: HOSPITAL | Age: 69
End: 2019-11-08

## 2019-11-08 DIAGNOSIS — H34.10 CENTRAL RETINAL ARTERY OCCLUSION, UNSPECIFIED LATERALITY: Primary | ICD-10-CM

## 2019-11-08 RX ORDER — SODIUM CHLORIDE 0.9 % (FLUSH) 0.9 %
1-10 SYRINGE (ML) INJECTION AS NEEDED
Status: CANCELLED | OUTPATIENT
Start: 2019-11-18

## 2019-11-08 RX ORDER — SODIUM CHLORIDE 0.9 % (FLUSH) 0.9 %
3 SYRINGE (ML) INJECTION EVERY 12 HOURS SCHEDULED
Status: CANCELLED | OUTPATIENT
Start: 2019-11-18

## 2019-11-08 RX ORDER — FAMOTIDINE 20 MG/1
20 TABLET, FILM COATED ORAL
Status: CANCELLED | OUTPATIENT
Start: 2019-11-18

## 2019-11-08 RX ORDER — LIDOCAINE AND PRILOCAINE 25; 25 MG/G; MG/G
CREAM TOPICAL ONCE
Status: CANCELLED | OUTPATIENT
Start: 2019-11-18 | End: 2019-11-08

## 2019-11-08 RX ORDER — SODIUM CHLORIDE 9 MG/ML
100 INJECTION, SOLUTION INTRAVENOUS CONTINUOUS
Status: CANCELLED | OUTPATIENT
Start: 2019-11-18

## 2019-11-08 NOTE — TELEPHONE ENCOUNTER
I have scheduled patient for a cerebral angiogram with Dr. Dale on November 18th. Should he stop xarelto for that and for how long? Thank you.

## 2019-11-11 ENCOUNTER — OFFICE VISIT (OUTPATIENT)
Dept: CARDIOLOGY | Facility: CLINIC | Age: 69
End: 2019-11-11

## 2019-11-11 VITALS
DIASTOLIC BLOOD PRESSURE: 80 MMHG | HEIGHT: 74 IN | BODY MASS INDEX: 29.52 KG/M2 | HEART RATE: 86 BPM | SYSTOLIC BLOOD PRESSURE: 136 MMHG | OXYGEN SATURATION: 98 % | WEIGHT: 230 LBS

## 2019-11-11 DIAGNOSIS — H34.10 CENTRAL RETINAL ARTERY OCCLUSION, UNSPECIFIED LATERALITY: ICD-10-CM

## 2019-11-11 DIAGNOSIS — I73.9 PVD (PERIPHERAL VASCULAR DISEASE) WITH CLAUDICATION (HCC): ICD-10-CM

## 2019-11-11 DIAGNOSIS — Z86.73 HISTORY OF STROKE: ICD-10-CM

## 2019-11-11 DIAGNOSIS — I73.9 PERIPHERAL VASCULAR DISEASE (HCC): ICD-10-CM

## 2019-11-11 DIAGNOSIS — I65.22 STENOSIS OF LEFT CAROTID ARTERY: Primary | ICD-10-CM

## 2019-11-11 PROBLEM — I25.10 ARTERIOSCLEROSIS OF CORONARY ARTERY: Status: ACTIVE | Noted: 2019-11-11

## 2019-11-11 PROCEDURE — 99213 OFFICE O/P EST LOW 20 MIN: CPT | Performed by: PHYSICIAN ASSISTANT

## 2019-11-11 PROCEDURE — 93000 ELECTROCARDIOGRAM COMPLETE: CPT | Performed by: PHYSICIAN ASSISTANT
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
## 2019-11-11 NOTE — TELEPHONE ENCOUNTER
I did not order this study and I did not order the Xarelto.  It looks like the Xarelto and the study were both ordered by neurology.  You will need to check with them for their recommendations.

## 2019-11-12 ENCOUNTER — TELEPHONE (OUTPATIENT)
Dept: NEUROLOGY | Facility: CLINIC | Age: 69
End: 2019-11-12

## 2019-11-12 NOTE — TELEPHONE ENCOUNTER
----- Message from EVY Hankins sent at 11/12/2019  2:31 PM EST -----  Regarding: FW: Neurosurgery - Procedure      ----- Message -----  From: Bunny Vallejo MD  Sent: 11/12/2019   9:10 AM  To: EVY Hankins  Subject: RE: Neurosurgery - Procedure                     Ok to hold Xarelto, no bridge needed for this  [place]  ----- Message -----  From: Marina Crocker APRN  Sent: 11/12/2019   9:09 AM  To: Bunny Vallejo MD  Subject: FW: Neurosurgery - Procedure                     Thoughts?   Want Lovenox bridge?     ----- Message -----  From: Carla Torrez MA  Sent: 11/11/2019  12:46 PM  To: EVY Hankins  Subject: Neurosurgery - Procedure                         Donnie from neurosurgery called.  Pt is to have a procedure on 11/18/19 and they are wanting to know if it is okay for pt to hold Xarelto.      Donnie - [phone]      Thank you

## 2019-11-12 NOTE — TELEPHONE ENCOUNTER
Called Donnie in Neurosurgery and informed her, per Dr. Vallejo, it is okay to hold xarelto, no bridge needed.  Donnie will ask Dr. Dale how long pt will need to be off, as our office recommends the least amount of time possible.

## 2019-11-14 ENCOUNTER — APPOINTMENT (OUTPATIENT)
Dept: PREADMISSION TESTING | Facility: HOSPITAL | Age: 69
End: 2019-11-14

## 2019-11-14 VITALS
RESPIRATION RATE: 20 BRPM | TEMPERATURE: 97.5 F | BODY MASS INDEX: 29.26 KG/M2 | HEIGHT: 74 IN | OXYGEN SATURATION: 95 % | HEART RATE: 78 BPM | SYSTOLIC BLOOD PRESSURE: 154 MMHG | WEIGHT: 228 LBS | DIASTOLIC BLOOD PRESSURE: 75 MMHG

## 2019-11-14 DIAGNOSIS — H34.10 CENTRAL RETINAL ARTERY OCCLUSION, UNSPECIFIED LATERALITY: ICD-10-CM

## 2019-11-14 LAB
ANION GAP SERPL CALCULATED.3IONS-SCNC: 11 MMOL/L (ref 5–15)
BASOPHILS # BLD AUTO: 0.05 10*3/MM3 (ref 0–0.2)
BASOPHILS NFR BLD AUTO: 0.7 % (ref 0–1.5)
BUN BLD-MCNC: 15 MG/DL (ref 8–23)
BUN/CREAT SERPL: 14.7 (ref 7–25)
CALCIUM SPEC-SCNC: 9.4 MG/DL (ref 8.6–10.5)
CHLORIDE SERPL-SCNC: 100 MMOL/L (ref 98–107)
CO2 SERPL-SCNC: 29 MMOL/L (ref 22–29)
CREAT BLD-MCNC: 1.02 MG/DL (ref 0.76–1.27)
DEPRECATED RDW RBC AUTO: 44.4 FL (ref 37–54)
EOSINOPHIL # BLD AUTO: 0.47 10*3/MM3 (ref 0–0.4)
EOSINOPHIL NFR BLD AUTO: 6.2 % (ref 0.3–6.2)
ERYTHROCYTE [DISTWIDTH] IN BLOOD BY AUTOMATED COUNT: 12.7 % (ref 12.3–15.4)
GFR SERPL CREATININE-BSD FRML MDRD: 72 ML/MIN/1.73
GLUCOSE BLD-MCNC: 130 MG/DL (ref 65–99)
HCT VFR BLD AUTO: 45.6 % (ref 37.5–51)
HGB BLD-MCNC: 15.3 G/DL (ref 13–17.7)
IMM GRANULOCYTES # BLD AUTO: 0.02 10*3/MM3 (ref 0–0.05)
IMM GRANULOCYTES NFR BLD AUTO: 0.3 % (ref 0–0.5)
LYMPHOCYTES # BLD AUTO: 2.56 10*3/MM3 (ref 0.7–3.1)
LYMPHOCYTES NFR BLD AUTO: 33.9 % (ref 19.6–45.3)
MCH RBC QN AUTO: 31.2 PG (ref 26.6–33)
MCHC RBC AUTO-ENTMCNC: 33.6 G/DL (ref 31.5–35.7)
MCV RBC AUTO: 92.9 FL (ref 79–97)
MONOCYTES # BLD AUTO: 0.65 10*3/MM3 (ref 0.1–0.9)
MONOCYTES NFR BLD AUTO: 8.6 % (ref 5–12)
NEUTROPHILS # BLD AUTO: 3.8 10*3/MM3 (ref 1.7–7)
NEUTROPHILS NFR BLD AUTO: 50.3 % (ref 42.7–76)
NRBC BLD AUTO-RTO: 0 /100 WBC (ref 0–0.2)
PLATELET # BLD AUTO: 221 10*3/MM3 (ref 140–450)
PMV BLD AUTO: 11.8 FL (ref 6–12)
POTASSIUM BLD-SCNC: 4 MMOL/L (ref 3.5–5.2)
RBC # BLD AUTO: 4.91 10*6/MM3 (ref 4.14–5.8)
SODIUM BLD-SCNC: 140 MMOL/L (ref 136–145)
WBC NRBC COR # BLD: 7.55 10*3/MM3 (ref 3.4–10.8)

## 2019-11-14 PROCEDURE — 80048 BASIC METABOLIC PNL TOTAL CA: CPT | Performed by: RADIOLOGY

## 2019-11-14 PROCEDURE — 85025 COMPLETE CBC W/AUTO DIFF WBC: CPT | Performed by: RADIOLOGY

## 2019-11-14 PROCEDURE — 36415 COLL VENOUS BLD VENIPUNCTURE: CPT

## 2019-11-14 RX ORDER — ISOSORBIDE MONONITRATE 30 MG/1
30 TABLET, EXTENDED RELEASE ORAL EVERY MORNING
COMMUNITY

## 2019-11-14 NOTE — DISCHARGE INSTRUCTIONS
Take the following medications the morning of surgery with a small sip of water:isosorbide    Arrival time 8:00 am    General Instructions:  • Do not eat solid food after midnight the night before surgery.  • You may drink clear liquids day of surgery but must stop at least one hour before your hospital arrival time.  • It is beneficial for you to have a clear drink that contains carbohydrates the day of surgery.  We suggest a 12 to 20 ounce bottle of Gatorade or Powerade for non-diabetic patients or a 12 to 20 ounce bottle of G2 or Powerade Zero for diabetic patients. (Pediatric patients, are not advised to drink a 12 to 20 ounce carbohydrate drink)    Clear liquids are liquids you can see through.  Nothing red in color.     Plain water                               Sports drinks  Sodas                                   Gelatin (Jell-O)  Fruit juices without pulp such as white grape juice and apple juice  Popsicles that contain no fruit or yogurt  Tea or coffee (no cream or milk added)  Gatorade / Powerade  G2 / Powerade Zero    • Infants may have breast milk up to four hours before surgery.  • Infants drinking formula may drink formula up to six hours before surgery.   • Patients who avoid smoking, chewing tobacco and alcohol for 4 weeks prior to surgery have a reduced risk of post-operative complications.  Quit smoking as many days before surgery as you can.  • Do not smoke, use chewing tobacco or drink alcohol the day of surgery.   • If applicable bring your C-PAP/ BI-PAP machine.  • Bring any papers given to you in the doctor’s office.  • Wear clean comfortable clothes.  • Do not wear contact lenses, false eyelashes or make-up.  Bring a case for your glasses.   • Bring crutches or walker if applicable.  • Remove all piercings.  Leave jewelry and any other valuables at home.  • Hair extensions with metal clips must be removed prior to surgery.  • The Pre-Admission Testing nurse will instruct you to bring  medications if unable to obtain an accurate list in Pre-Admission Testing.        If you were given a blood bank ID arm band remember to bring it with you the day of surgery.    Preventing a Surgical Site Infection:  • For 2 to 3 days before surgery, avoid shaving with a razor because the razor can irritate skin and make it easier to develop an infection.    • Any areas of open skin can increase the risk of a post-operative wound infection by allowing bacteria to enter and travel throughout the body.  Notify your surgeon if you have any skin wounds / rashes even if it is not near the expected surgical site.  The area will need assessed to determine if surgery should be delayed until it is healed.  • The night prior to surgery sleep in a clean bed with clean clothing.  Do not allow pets to sleep with you.  • Shower on the morning of surgery using a fresh bar of anti-bacterial soap (such as Dial) and clean washcloth.  Dry with a clean towel and dress in clean clothing.  • Ask your surgeon if you will be receiving antibiotics prior to surgery.  • Make sure you, your family, and all healthcare providers clean their hands with soap and water or an alcohol based hand  before caring for you or your wound.    Day of surgery:  Your arrival time is approximately two hours before your scheduled surgery time.  Upon arrival, a Pre-op nurse and Anesthesiologist will review your health history, obtain vital signs, and answer questions you may have.  The only belongings needed at this time will be a list of your home medications and if applicable your C-PAP/BI-PAP machine.  If you are staying overnight your family can leave the rest of your belongings in the car and bring them to your room later.  A Pre-op nurse will start an IV and you may receive medication in preparation for surgery, including something to help you relax.  Your family will be able to see you in the Pre-op area.  Two visitors at a time will be allowed in  the Pre-op room.  While you are in surgery your family should notify the waiting room  if they leave the waiting room area and provide a contact phone number.    Please be aware that surgery does come with discomfort.  We want to make every effort to control your discomfort so please discuss any uncontrolled symptoms with your nurse.   Your doctor will most likely have prescribed pain medications.      If you are going home after surgery you will receive individualized written care instructions before being discharged.  A responsible adult must drive you to and from the hospital on the day of your surgery and stay with you for 24 hours.    If you are staying overnight following surgery, you will be transported to your hospital room following the recovery period.  [facility] has all private rooms.    You have received a list of surgical assistants for your reference.  If you have any questions please call Pre-Admission Testing at [phone].  Deductibles and co-payments are collected on the day of service. Please be prepared to pay the required co-pay, deductible or deposit on the day of service as defined by your plan.  2% CHLORAHEXIDINE GLUCONATE* CLOTH  Preparing or “prepping” skin before surgery can reduce the risk of infection at the surgical site. To make the process easier, [facility] has chosen disposable cloths moistened with a rinse-free, 2% Chlorhexidine Gluconate (CHG) antiseptic solution. The steps below outline the prepping process and should be carefully followed.        Use the prep cloth on the area that is circled in the diagram             Directions Night before Surgery  1) Shower using a fresh bar of anti-bacterial soap (such as Dial) and clean washcloth.  Use a clean towel to completely dry your skin.  2) Do not use any lotions, oils or creams on your skin.  3) Open the package and remove 1 cloth, wipe your skin for 30 seconds in a circular motion.  Allow  to dry for 3 minutes.  4) Repeat #3 with second cloth.  5) Do not touch your eyes, ears, or mouth with the prep cloth.  6) Allow the wet prep solution to air dry.  7) Discard the prep cloth and wash your hands with soap and water.   8) Dress in clean bed clothes and sleep on fresh clean bed sheets.   9) You may experience some temporary itching after the prep.    Directions Day of Surgery  1) Repeat steps 1,2,3,4,5,6,7, and 9.   2) Dress in clean clothes before coming to the hospital.

## 2019-11-18 ENCOUNTER — APPOINTMENT (OUTPATIENT)
Dept: GENERAL RADIOLOGY | Facility: HOSPITAL | Age: 69
End: 2019-11-18

## 2019-11-18 ENCOUNTER — HOSPITAL ENCOUNTER (OUTPATIENT)
Facility: HOSPITAL | Age: 69
Discharge: HOME | End: 2019-11-18
Attending: RADIOLOGY | Admitting: RADIOLOGY

## 2019-11-18 VITALS
HEIGHT: 74 IN | BODY MASS INDEX: 29.22 KG/M2 | TEMPERATURE: 97.7 F | OXYGEN SATURATION: 98 % | DIASTOLIC BLOOD PRESSURE: 90 MMHG | SYSTOLIC BLOOD PRESSURE: 132 MMHG | HEART RATE: 81 BPM | RESPIRATION RATE: 16 BRPM | WEIGHT: 227.7 LBS

## 2019-11-18 DIAGNOSIS — H34.10 CENTRAL RETINAL ARTERY OCCLUSION, UNSPECIFIED LATERALITY: ICD-10-CM

## 2019-11-18 LAB — GLUCOSE BLDC GLUCOMTR-MCNC: 128 MG/DL (ref 70–130)

## 2019-11-18 PROCEDURE — 82962 GLUCOSE BLOOD TEST: CPT

## 2019-11-18 PROCEDURE — 63710000001 FAMOTIDINE 20 MG TABLET: Performed by: RADIOLOGY

## 2019-11-18 PROCEDURE — C1769 GUIDE WIRE: HCPCS | Performed by: RADIOLOGY

## 2019-11-18 PROCEDURE — C1760 CLOSURE DEV, VASC: HCPCS | Performed by: RADIOLOGY

## 2019-11-18 PROCEDURE — 0 IODIXANOL PER 1 ML: Performed by: RADIOLOGY

## 2019-11-18 PROCEDURE — A9270 NON-COVERED ITEM OR SERVICE: HCPCS | Performed by: RADIOLOGY

## 2019-11-18 PROCEDURE — 63710000001 LIDOCAINE-PRILOCAINE 2.5-2.5 % CREAM 5 G TUBE: Performed by: RADIOLOGY

## 2019-11-18 PROCEDURE — C1894 INTRO/SHEATH, NON-LASER: HCPCS | Performed by: RADIOLOGY

## 2019-11-18 PROCEDURE — 25010000002 MIDAZOLAM PER 1 MG: Performed by: RADIOLOGY

## 2019-11-18 PROCEDURE — 25010000002 FENTANYL CITRATE (PF) 100 MCG/2ML SOLUTION: Performed by: RADIOLOGY

## 2019-11-18 PROCEDURE — 25010000002 HEPARIN (PORCINE) PER 1000 UNITS: Performed by: RADIOLOGY

## 2019-11-18 RX ORDER — FENTANYL CITRATE 50 UG/ML
INJECTION, SOLUTION INTRAMUSCULAR; INTRAVENOUS
Status: DISCONTINUED
Start: 2019-11-18 | End: 2019-11-18 | Stop reason: HOSPADM

## 2019-11-18 RX ORDER — FENTANYL CITRATE 50 UG/ML
INJECTION, SOLUTION INTRAMUSCULAR; INTRAVENOUS
Status: COMPLETED | OUTPATIENT
Start: 2019-11-18 | End: 2019-11-18

## 2019-11-18 RX ORDER — SODIUM CHLORIDE 0.9 % (FLUSH) 0.9 %
3 SYRINGE (ML) INJECTION EVERY 12 HOURS SCHEDULED
Status: DISCONTINUED | OUTPATIENT
Start: 2019-11-18 | End: 2019-11-18 | Stop reason: HOSPADM

## 2019-11-18 RX ORDER — SODIUM CHLORIDE 0.9 % (FLUSH) 0.9 %
1-10 SYRINGE (ML) INJECTION AS NEEDED
Status: DISCONTINUED | OUTPATIENT
Start: 2019-11-18 | End: 2019-11-18 | Stop reason: HOSPADM

## 2019-11-18 RX ORDER — IBUPROFEN 200 MG
400 TABLET ORAL EVERY 6 HOURS PRN
COMMUNITY
End: 2021-10-25

## 2019-11-18 RX ORDER — ATORVASTATIN CALCIUM 40 MG/1
40 TABLET, FILM COATED ORAL NIGHTLY
COMMUNITY

## 2019-11-18 RX ORDER — SODIUM CHLORIDE 9 MG/ML
100 INJECTION, SOLUTION INTRAVENOUS CONTINUOUS
Status: DISCONTINUED | OUTPATIENT
Start: 2019-11-18 | End: 2019-11-18 | Stop reason: HOSPADM

## 2019-11-18 RX ORDER — FAMOTIDINE 20 MG/1
20 TABLET, FILM COATED ORAL
Status: COMPLETED | OUTPATIENT
Start: 2019-11-18 | End: 2019-11-18

## 2019-11-18 RX ORDER — IODIXANOL 320 MG/ML
250 INJECTION, SOLUTION INTRAVASCULAR
Status: COMPLETED | OUTPATIENT
Start: 2019-11-18 | End: 2019-11-18

## 2019-11-18 RX ORDER — ASPIRIN 81 MG/1
81 TABLET ORAL DAILY
Qty: 30 TABLET | Refills: 12 | Status: SHIPPED | OUTPATIENT
Start: 2019-11-18

## 2019-11-18 RX ORDER — LIDOCAINE HYDROCHLORIDE 10 MG/ML
INJECTION, SOLUTION EPIDURAL; INFILTRATION; INTRACAUDAL; PERINEURAL AS NEEDED
Status: DISCONTINUED | OUTPATIENT
Start: 2019-11-18 | End: 2019-11-18 | Stop reason: HOSPADM

## 2019-11-18 RX ORDER — LIDOCAINE AND PRILOCAINE 25; 25 MG/G; MG/G
CREAM TOPICAL ONCE
Status: COMPLETED | OUTPATIENT
Start: 2019-11-18 | End: 2019-11-18

## 2019-11-18 RX ORDER — MIDAZOLAM HYDROCHLORIDE 1 MG/ML
INJECTION INTRAMUSCULAR; INTRAVENOUS
Status: COMPLETED | OUTPATIENT
Start: 2019-11-18 | End: 2019-11-18

## 2019-11-18 RX ORDER — MIDAZOLAM HYDROCHLORIDE 1 MG/ML
INJECTION INTRAMUSCULAR; INTRAVENOUS
Status: DISCONTINUED
Start: 2019-11-18 | End: 2019-11-18 | Stop reason: HOSPADM

## 2019-11-18 RX ADMIN — Medication 1 MG: at 10:39

## 2019-11-18 RX ADMIN — SODIUM CHLORIDE 100 ML/HR: 9 INJECTION, SOLUTION INTRAVENOUS at 09:17

## 2019-11-18 RX ADMIN — LIDOCAINE AND PRILOCAINE 1 APPLICATION: 25; 25 CREAM TOPICAL at 09:22

## 2019-11-18 RX ADMIN — IODIXANOL 109.4 ML: 320 INJECTION, SOLUTION INTRAVASCULAR at 10:25

## 2019-11-18 RX ADMIN — FAMOTIDINE 20 MG: 20 TABLET, FILM COATED ORAL at 09:22

## 2019-11-18 RX ADMIN — FENTANYL CITRATE 50 MCG: 50 INJECTION INTRAMUSCULAR; INTRAVENOUS at 10:46

## 2019-11-18 NOTE — DISCHARGE INSTRUCTIONS
SEDATION DISCHARGE INSTRUCTIONS.  IMPORTANT: The following information will help you return to your best level of health.  Sedation.  You have had a procedure that called for some medicine to reduce anxiety and pain. This medicine (or medicines) is called sedation. After receiving the medicine, you may be sleepy, but able to breathe on your own. The effects of the medicine may last for several hours.  Follow these instructions after sedation:  Go right home. Rest quietly at home today, then you can be up and about.  Do not drink alcohol, drive or operate machinery for 24 hours.  Do not do anything where light-headedness or clumsiness would be dangerous.  Do not make important decisions or sign any legal papers for the next 24 hours.  Make sure A RESPONSIBLE PERSON stays with you the rest of today and overnight for your protection and safety.  Start your diet with fluids and light foods (jello, soup, juice, toast). Then, slowly progress to your usual diet if you are not sick to your stomach.  Call your doctor if you have:  a gray or blue skin color.  excess sleepiness.  repeated vomiting.  trouble breathing.  any new problems or concerns.    POSTOPERATIVE CARE INSTRUCTIONS FOR CEREBRAL ANGIOGRAPHY    1. After your angiogram, you will need to be less active than normal. you should plan to be off work and relax for the next two days. This is because Dr. Manzano has placed a plug in your artery to close it up, but it still needs some time to heal.    2. You may feel some stinging and see some slight swelling. You may also see bruising; possibly a large amount of bruising. This is normal. An ice pack will help relieve the stinging and swelling. Use the pack at your puncture site for about 20 minutes; then remove it for at least 30 minutes. You may repeat this as often as you need.    3. Because some dye was injected into your artery, you will need to drink a lot of fluids over the next 2 days in order to flush it out  of your body. Water and juice are the best choices. If you need to drink soda, coffee or tea, choose decaffeinated. Caffeine will dehydrate you, and it will take you longer to flush out the dye from your body. You will know you are getting enough fluids if your urine is clear or very pale yellow.    4. Avoid strenuous activity and heavy lifting intil you return to the office. Heavy lifting is considered anything over 10 pounds. A gallon of milk weighs 8 pounds. If you have to hold anything more than 10 pounds (such as a baby), have someone place that object in your lap or arms.    5. Check your dressing occasionally. You may remove it 48 hours after your procedure. If you notice that you are bleeding or your dressing becomes soaked with fresh blood, call 911 and have them take you to the emergency room.   Hold pressure over the site. Call our office immediately ([phone]) if you notice the following: any bright redness at the puncture site, coldness in the leg, periods of the chills, red streaks running up your abdomen or down your leg, a fever over 101 degrees F orally, green or yellow discharge or discharge that has a foul odor. If it is after office hours, your call will be forwarded to the neurosurgeon on-call.    6. If you have any questions or concerns at any time, please feel free to call the office at ([phone]).Dr. Manzano or neurosurgeon on-call is available 24-hours a day. Our staff is here to help you in any way we can.

## 2019-11-18 NOTE — OP NOTE
Pre-Op Dx: Central Retinal Occlusion    Post-Op Dx: LICA Origin Occlusion    Procedure: Cerebral DSA    Findings: Completely occluded LICA origin and patent LECA which supplies left ophthalmic artery via ILT collaterals. Antegrade flow then to the CRA and choroidal blush present. Possible embolus from stump. No string sign seen.     Surgeon: Eliseo Dale MD    Sedation given with Fentanyl and Versed IV.     L CFA 6F Angioseal placed.    No complications.    EBL: 10 cc     21-Aug-2018 20:21

## 2019-11-19 ENCOUNTER — TELEPHONE (OUTPATIENT)
Dept: NEUROLOGY | Facility: CLINIC | Age: 69
End: 2019-11-19

## 2019-11-19 NOTE — TELEPHONE ENCOUNTER
Two Week Stroke Phone Call  Spoke with the patient  · Admission Date:  10/29/2019  · Discharge Date:  10/30/2019  · Discharge Destination:  · Meds reviewed with patient/caregiver?    [x]Yes [] No   o Antiplatelet:  ASA  - Understands purpose     [x]  Yes     []  No     - Understands how to take      [x]  Yes     []  No    o Cholesterol Reducing: Lipitor  - Understands purpose     [x]  Yes     []  No    - Understands how to take      [x]  Yes     []  No   · Is the patient taking all medication as directed?   [x]  Yes  []  No  · Discussed personal risk factors   [x]  Yes []  No    o High cholesterol   - Review desired LDL goal <70  o Atherosclerosis  - Plaque inside the arteries, “hardening of the arteries”  o Atrial fibrillation   • Discussed signs and symptoms of stroke and when patient to call 911?   [x]  Yes []  No  o Sudden weakness or numbness of the face, arm, or leg especially on one side of the body  o Sudden confusion, trouble speaking or understanding  o Sudden trouble seeing in one or both eyes   o Sudden trouble walking, dizziness, loss of balance or coordination  o Sudden severe headaches with no known cause    Notified Patient that if any of these symptoms occur to call 911  · Does the patient have any new signs or symptoms of a stroke?   [x]  Yes     []  No  · Does the patient have an appointment with PCP?  [x]  Yes     []  No  · Does the patient have 3 month Stroke Clinic appointment?  · []  Yes     [x]  No  · Following up w/ neurosx  · Is the patient currently in therapy, outpatient, or home health?  []  Yes     [x]  No    Needs a referral?      []  Yes     [x]  No   Does the patient have increasing stiffness in your arms, hands, or legs?    []  Yes     [x]  No   Is this interfering with activities of daily living?   []  Yes     [x]  No  Patient Satisfaction   · How would you rate your satisfaction with the instructions provided about your specific risk factors for stroke?   []Poor  [] Fair    []  Good [x] Very Good  [] Excellent   · How would you rate your satisfaction with the instructions provided on the warnings signs and symptoms of stroke?   []Poor  [] Fair   [] Good [x] Very Good  [] Excellent   · How well did we explain the importance of calling 911 to activate the emergency medical system for new signs and symptoms of stroke?    []Poor  [] Fair   [] Good [x] Very Good  [] Excellent   · Would you recommend the stroke center to your friends and family?   []Definitely Would Not  [] Probably Would Not  [] Neutral   []  Probably Would [x] Definitely Would

## 2019-11-22 ENCOUNTER — HOSPITAL ENCOUNTER (OUTPATIENT)
Dept: CARDIOLOGY | Facility: HOSPITAL | Age: 69
Discharge: HOME | End: 2019-11-22

## 2019-11-22 DIAGNOSIS — I73.9 PERIPHERAL VASCULAR DISEASE (HCC): ICD-10-CM

## 2019-11-22 DIAGNOSIS — I73.9 PVD (PERIPHERAL VASCULAR DISEASE) WITH CLAUDICATION (HCC): ICD-10-CM

## 2019-12-04 ENCOUNTER — DOCUMENTATION (OUTPATIENT)
Dept: ORTHOPEDIC SURGERY | Facility: HOSPITAL | Age: 69
End: 2019-12-04

## 2019-12-04 NOTE — PROGRESS NOTES
Follow-up Office Visit  12-4-2019    Mr. Orantes  Is the 70 yo male with acute left eye vision loss and subsequent w/u showing an occluded LICA origin. No intervention could be performed for revascularization on angiography performed on 11-.  The patients vision has steadily improved in the left eye and Xarelto was discontinued with ASA increased to daily. No new events reported and the patient feels good.    PMH: See previous    SH: See previous    FH: See previous    ROS: No Headaches or dizziness.   No difficulty breathing   No chest pain   No extremity weakness     PE:  Ox3, awake and alert with fluent speech.   Memory intact and good problem solving     skills.   CN III-XII intact.   5/5 Strength and Sensation in all 4  extremities.   No cerebellar dysmetria.   Right groin puncture sight is intact with no  hematoma. Good distal pulses.    Medications:  aspirin 81 MG EC tablet  Take 1 tablet by mouth Daily., Starting Mon 11/18/2019, Normal  Last Dose:Not Recorded  Refills:12 ordered  Pharmacy:[facility]/pharmacy #[id number] [place] [id number] [address] - [phone] [facility] [phone] FX      atorvastatin (LIPITOR) 40 MG tablet  Take 40 mg by mouth Every Night.  Last Dose:Not Recorded       colchicine 0.6 MG tablet    Take 0.6 mg by mouth As Needed.    Last Dose:Not Recorded     ibuprofen (ADVIL,MOTRIN) 200 MG tablet  Take 400 mg by mouth Every 6 (Six) Hours As Needed for Mild Pain .  Last Dose:Not Recorded     isosorbide mononitrate (IMDUR) 30 MG 24 hr tablet  Take 30 mg by mouth Every Morning.  Last Dose:Not Recorded     metFORMIN (GLUCOPHAGE) 500 MG tablet  Take 500 mg by mouth 2 (Two) Times a Day With Meals.  Last Dose:Not Recorded     triamterene-hydrochlorothiazide (DYAZIDE) 37.5-25 MG per capsule  Take 1 capsule by mouth Every Morning.  Last Dose:Not Recorded    Assessment/ Plan:  Left eye vision is improving and Ophthalmology following.  Medical Therapy for atherosclerotic  disease ongoing and ASA/ Lipitor to continue.  DM treated with PO medication and followed up by primary physician.  Good recovery from angiogram with no further follow up with me needed unless new cerebrovascular problems arise.

## 2020-10-22 ENCOUNTER — OFFICE VISIT (OUTPATIENT)
Dept: CARDIOLOGY | Facility: CLINIC | Age: 70
End: 2020-10-22

## 2020-10-22 VITALS
HEART RATE: 83 BPM | BODY MASS INDEX: 29.26 KG/M2 | DIASTOLIC BLOOD PRESSURE: 70 MMHG | WEIGHT: 228 LBS | HEIGHT: 74 IN | SYSTOLIC BLOOD PRESSURE: 142 MMHG

## 2020-10-22 DIAGNOSIS — I73.9 PAD (PERIPHERAL ARTERY DISEASE) (HCC): Primary | ICD-10-CM

## 2020-10-22 DIAGNOSIS — I73.9 PERIPHERAL VASCULAR DISEASE (HCC): ICD-10-CM

## 2020-10-22 DIAGNOSIS — I65.29 OCCLUSION OF CAROTID ARTERY, UNSPECIFIED LATERALITY: ICD-10-CM

## 2020-10-22 PROCEDURE — 93000 ELECTROCARDIOGRAM COMPLETE: CPT | Performed by: INTERNAL MEDICINE

## 2020-10-22 PROCEDURE — 99214 OFFICE O/P EST MOD 30 MIN: CPT | Performed by: INTERNAL MEDICINE
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
## 2021-10-25 ENCOUNTER — OFFICE VISIT (OUTPATIENT)
Dept: CARDIOLOGY | Facility: CLINIC | Age: 71
End: 2021-10-25

## 2021-10-25 VITALS
BODY MASS INDEX: 28.62 KG/M2 | DIASTOLIC BLOOD PRESSURE: 80 MMHG | HEIGHT: 74 IN | HEART RATE: 73 BPM | SYSTOLIC BLOOD PRESSURE: 130 MMHG | WEIGHT: 223 LBS

## 2021-10-25 DIAGNOSIS — I73.9 PAD (PERIPHERAL ARTERY DISEASE) (HCC): Primary | ICD-10-CM

## 2021-10-25 DIAGNOSIS — I73.9 PVD (PERIPHERAL VASCULAR DISEASE) WITH CLAUDICATION (HCC): ICD-10-CM

## 2021-10-25 PROCEDURE — 93000 ELECTROCARDIOGRAM COMPLETE: CPT | Performed by: INTERNAL MEDICINE

## 2021-10-25 PROCEDURE — 99214 OFFICE O/P EST MOD 30 MIN: CPT | Performed by: INTERNAL MEDICINE
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
## 2021-11-17 ENCOUNTER — TELEPHONE (OUTPATIENT)
Dept: CARDIOLOGY | Facility: CLINIC | Age: 71
End: 2021-11-17

## 2021-11-17 NOTE — TELEPHONE ENCOUNTER
Pt called. His insurance was denied based on the diagnosis PVD being added to the diagnosis he already had of PAD. I explained that they are the same name. He may need a statement from us stating that he has been stable with his health from our standpoint for several years.    Keshia

## 2022-11-03 ENCOUNTER — OFFICE VISIT (OUTPATIENT)
Dept: CARDIOLOGY | Facility: CLINIC | Age: 72
End: 2022-11-03

## 2022-11-03 VITALS
SYSTOLIC BLOOD PRESSURE: 140 MMHG | BODY MASS INDEX: 29 KG/M2 | DIASTOLIC BLOOD PRESSURE: 74 MMHG | HEIGHT: 74 IN | HEART RATE: 84 BPM | WEIGHT: 226 LBS

## 2022-11-03 DIAGNOSIS — I73.9 PERIPHERAL VASCULAR DISEASE: Primary | ICD-10-CM

## 2022-11-03 DIAGNOSIS — I10 PRIMARY HYPERTENSION: ICD-10-CM

## 2022-11-03 DIAGNOSIS — I65.22 STENOSIS OF LEFT CAROTID ARTERY: ICD-10-CM

## 2022-11-03 PROCEDURE — 93000 ELECTROCARDIOGRAM COMPLETE: CPT | Performed by: NURSE PRACTITIONER

## 2022-11-03 PROCEDURE — 99214 OFFICE O/P EST MOD 30 MIN: CPT | Performed by: NURSE PRACTITIONER
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
## 2023-11-06 ENCOUNTER — OFFICE VISIT (OUTPATIENT)
Age: 73
End: 2023-11-06
Payer: MEDICARE

## 2023-11-06 VITALS
HEART RATE: 89 BPM | BODY MASS INDEX: 29.39 KG/M2 | SYSTOLIC BLOOD PRESSURE: 130 MMHG | DIASTOLIC BLOOD PRESSURE: 74 MMHG | HEIGHT: 74 IN | WEIGHT: 229 LBS | OXYGEN SATURATION: 98 %

## 2023-11-06 DIAGNOSIS — I10 PRIMARY HYPERTENSION: ICD-10-CM

## 2023-11-06 DIAGNOSIS — I65.22 STENOSIS OF LEFT CAROTID ARTERY: ICD-10-CM

## 2023-11-06 DIAGNOSIS — I73.9 PERIPHERAL VASCULAR DISEASE: Primary | ICD-10-CM

## 2023-11-06 PROBLEM — H47.012 ISCHEMIC OPTIC NEUROPATHY OF LEFT EYE: Status: ACTIVE | Noted: 2023-07-25

## 2023-11-06 PROBLEM — M54.2 NECK PAIN: Status: ACTIVE | Noted: 2019-03-19

## 2023-11-06 PROBLEM — Z78.9 NON-SMOKER: Status: ACTIVE | Noted: 2017-05-01

## 2023-11-06 PROBLEM — R35.1 NOCTURIA: Status: ACTIVE | Noted: 2019-01-09

## 2023-11-06 PROCEDURE — 3075F SYST BP GE 130 - 139MM HG: CPT | Performed by: INTERNAL MEDICINE

## 2023-11-06 PROCEDURE — 99214 OFFICE O/P EST MOD 30 MIN: CPT | Performed by: INTERNAL MEDICINE

## 2023-11-06 PROCEDURE — 3078F DIAST BP <80 MM HG: CPT | Performed by: INTERNAL MEDICINE

## 2023-11-06 PROCEDURE — 93000 ELECTROCARDIOGRAM COMPLETE: CPT | Performed by: INTERNAL MEDICINE
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
## 2024-10-17 ENCOUNTER — APPOINTMENT (OUTPATIENT)
Dept: GENERAL RADIOLOGY | Facility: HOSPITAL | Age: 74
End: 2024-10-17
Payer: MEDICARE

## 2024-10-17 ENCOUNTER — APPOINTMENT (OUTPATIENT)
Dept: CT IMAGING | Facility: HOSPITAL | Age: 74
End: 2024-10-17
Payer: MEDICARE

## 2024-10-17 ENCOUNTER — HOSPITAL ENCOUNTER (INPATIENT)
Facility: HOSPITAL | Age: 74
LOS: 5 days | Discharge: HOME | End: 2024-10-23
Attending: EMERGENCY MEDICINE | Admitting: INTERNAL MEDICINE
Payer: MEDICARE

## 2024-10-17 DIAGNOSIS — R53.1 DECREASED STRENGTH, ENDURANCE, AND MOBILITY: ICD-10-CM

## 2024-10-17 DIAGNOSIS — M10.00 ACUTE IDIOPATHIC GOUT, UNSPECIFIED SITE: ICD-10-CM

## 2024-10-17 DIAGNOSIS — N17.9 AKI (ACUTE KIDNEY INJURY): ICD-10-CM

## 2024-10-17 DIAGNOSIS — R68.89 DECREASED STRENGTH, ENDURANCE, AND MOBILITY: ICD-10-CM

## 2024-10-17 DIAGNOSIS — R53.1 GENERALIZED WEAKNESS: ICD-10-CM

## 2024-10-17 DIAGNOSIS — K92.0 HEMATEMESIS WITH NAUSEA: Primary | ICD-10-CM

## 2024-10-17 DIAGNOSIS — Z74.09 DECREASED STRENGTH, ENDURANCE, AND MOBILITY: ICD-10-CM

## 2024-10-17 DIAGNOSIS — R65.10 SIRS (SYSTEMIC INFLAMMATORY RESPONSE SYNDROME): ICD-10-CM

## 2024-10-17 DIAGNOSIS — M10.061 ACUTE IDIOPATHIC GOUT OF RIGHT KNEE: ICD-10-CM

## 2024-10-17 DIAGNOSIS — H47.012 ISCHEMIC OPTIC NEUROPATHY OF LEFT EYE: ICD-10-CM

## 2024-10-17 LAB
ABO GROUP BLD: NORMAL
ALBUMIN SERPL-MCNC: 3.8 G/DL (ref 3.5–5.2)
ALBUMIN/GLOB SERPL: 1.1 G/DL
ALP SERPL-CCNC: 97 U/L (ref 39–117)
ALT SERPL W P-5'-P-CCNC: 20 U/L (ref 1–41)
ANION GAP SERPL CALCULATED.3IONS-SCNC: 10.5 MMOL/L (ref 5–15)
APTT PPP: 26.3 SECONDS (ref 22.7–35.4)
AST SERPL-CCNC: 17 U/L (ref 1–40)
BASOPHILS # BLD AUTO: 0.06 10*3/MM3 (ref 0–0.2)
BASOPHILS NFR BLD AUTO: 0.3 % (ref 0–1.5)
BILIRUB SERPL-MCNC: 0.3 MG/DL (ref 0–1.2)
BILIRUB UR QL STRIP: NEGATIVE
BLD GP AB SCN SERPL QL: NEGATIVE
BUN SERPL-MCNC: 34 MG/DL (ref 8–23)
BUN/CREAT SERPL: 21.4 (ref 7–25)
CALCIUM SPEC-SCNC: 9.7 MG/DL (ref 8.6–10.5)
CHLORIDE SERPL-SCNC: 100 MMOL/L (ref 98–107)
CLARITY UR: CLEAR
CO2 SERPL-SCNC: 26.5 MMOL/L (ref 22–29)
COLOR UR: YELLOW
CREAT SERPL-MCNC: 1.59 MG/DL (ref 0.76–1.27)
D-LACTATE SERPL-SCNC: 2.4 MMOL/L (ref 0.5–2)
D-LACTATE SERPL-SCNC: 2.4 MMOL/L (ref 0.5–2)
D-LACTATE SERPL-SCNC: 2.7 MMOL/L (ref 0.5–2)
D-LACTATE SERPL-SCNC: 3.1 MMOL/L (ref 0.5–2)
DEPRECATED RDW RBC AUTO: 47.9 FL (ref 37–54)
EGFRCR SERPLBLD CKD-EPI 2021: 45 ML/MIN/1.73
EOSINOPHIL # BLD AUTO: 0.33 10*3/MM3 (ref 0–0.4)
EOSINOPHIL NFR BLD AUTO: 1.9 % (ref 0.3–6.2)
ERYTHROCYTE [DISTWIDTH] IN BLOOD BY AUTOMATED COUNT: 13.4 % (ref 12.3–15.4)
GLOBULIN UR ELPH-MCNC: 3.4 GM/DL
GLUCOSE BLDC GLUCOMTR-MCNC: 130 MG/DL (ref 70–130)
GLUCOSE BLDC GLUCOMTR-MCNC: 55 MG/DL (ref 70–130)
GLUCOSE BLDC GLUCOMTR-MCNC: 59 MG/DL (ref 70–130)
GLUCOSE SERPL-MCNC: 170 MG/DL (ref 65–99)
GLUCOSE UR STRIP-MCNC: ABNORMAL MG/DL
HCT VFR BLD AUTO: 44.1 % (ref 37.5–51)
HGB BLD-MCNC: 12.7 G/DL (ref 13–17.7)
HGB BLD-MCNC: 12.9 G/DL (ref 13–17.7)
HGB BLD-MCNC: 13.8 G/DL (ref 13–17.7)
HGB UR QL STRIP.AUTO: NEGATIVE
HOLD SPECIMEN: NORMAL
HOLD SPECIMEN: NORMAL
IMM GRANULOCYTES # BLD AUTO: 0.1 10*3/MM3 (ref 0–0.05)
IMM GRANULOCYTES NFR BLD AUTO: 0.6 % (ref 0–0.5)
INR PPP: 1.08 (ref 0.9–1.1)
KETONES UR QL STRIP: NEGATIVE
LEUKOCYTE ESTERASE UR QL STRIP.AUTO: NEGATIVE
LIPASE SERPL-CCNC: 38 U/L (ref 13–60)
LYMPHOCYTES # BLD AUTO: 2.62 10*3/MM3 (ref 0.7–3.1)
LYMPHOCYTES NFR BLD AUTO: 14.9 % (ref 19.6–45.3)
MCH RBC QN AUTO: 30.3 PG (ref 26.6–33)
MCHC RBC AUTO-ENTMCNC: 31.3 G/DL (ref 31.5–35.7)
MCV RBC AUTO: 96.7 FL (ref 79–97)
MONOCYTES # BLD AUTO: 1.33 10*3/MM3 (ref 0.1–0.9)
MONOCYTES NFR BLD AUTO: 7.6 % (ref 5–12)
NEUTROPHILS NFR BLD AUTO: 13.09 10*3/MM3 (ref 1.7–7)
NEUTROPHILS NFR BLD AUTO: 74.7 % (ref 42.7–76)
NITRITE UR QL STRIP: NEGATIVE
NRBC BLD AUTO-RTO: 0 /100 WBC (ref 0–0.2)
NT-PROBNP SERPL-MCNC: 265 PG/ML (ref 0–1800)
PH UR STRIP.AUTO: <=5 [PH] (ref 5–8)
PLATELET # BLD AUTO: 339 10*3/MM3 (ref 140–450)
PMV BLD AUTO: 11.2 FL (ref 6–12)
POTASSIUM SERPL-SCNC: 4.2 MMOL/L (ref 3.5–5.2)
PROT SERPL-MCNC: 7.2 G/DL (ref 6–8.5)
PROT UR QL STRIP: NEGATIVE
PROTHROMBIN TIME: 14.2 SECONDS (ref 11.7–14.2)
QT INTERVAL: 360 MS
QTC INTERVAL: 413 MS
RBC # BLD AUTO: 4.56 10*6/MM3 (ref 4.14–5.8)
RH BLD: POSITIVE
SODIUM SERPL-SCNC: 137 MMOL/L (ref 136–145)
SP GR UR STRIP: >1.03 (ref 1–1.03)
T&S EXPIRATION DATE: NORMAL
TROPONIN T SERPL HS-MCNC: 22 NG/L
UROBILINOGEN UR QL STRIP: ABNORMAL
WBC NRBC COR # BLD AUTO: 17.53 10*3/MM3 (ref 3.4–10.8)
WHOLE BLOOD HOLD COAG: NORMAL
WHOLE BLOOD HOLD SPECIMEN: NORMAL

## 2024-10-17 PROCEDURE — 85018 HEMOGLOBIN: CPT | Performed by: NURSE PRACTITIONER

## 2024-10-17 PROCEDURE — 25810000003 SODIUM CHLORIDE 0.9 % SOLUTION: Performed by: INTERNAL MEDICINE

## 2024-10-17 PROCEDURE — 71045 X-RAY EXAM CHEST 1 VIEW: CPT

## 2024-10-17 PROCEDURE — 93005 ELECTROCARDIOGRAM TRACING: CPT

## 2024-10-17 PROCEDURE — 25010000002 MORPHINE PER 10 MG: Performed by: EMERGENCY MEDICINE

## 2024-10-17 PROCEDURE — 74177 CT ABD & PELVIS W/CONTRAST: CPT

## 2024-10-17 PROCEDURE — 83605 ASSAY OF LACTIC ACID: CPT | Performed by: EMERGENCY MEDICINE

## 2024-10-17 PROCEDURE — 80053 COMPREHEN METABOLIC PANEL: CPT | Performed by: NURSE PRACTITIONER

## 2024-10-17 PROCEDURE — 85025 COMPLETE CBC W/AUTO DIFF WBC: CPT | Performed by: NURSE PRACTITIONER

## 2024-10-17 PROCEDURE — 83880 ASSAY OF NATRIURETIC PEPTIDE: CPT | Performed by: EMERGENCY MEDICINE

## 2024-10-17 PROCEDURE — 25010000002 ONDANSETRON PER 1 MG: Performed by: NURSE PRACTITIONER

## 2024-10-17 PROCEDURE — 85025 COMPLETE CBC W/AUTO DIFF WBC: CPT

## 2024-10-17 PROCEDURE — G0378 HOSPITAL OBSERVATION PER HR: HCPCS

## 2024-10-17 PROCEDURE — 86901 BLOOD TYPING SEROLOGIC RH(D): CPT | Performed by: NURSE PRACTITIONER

## 2024-10-17 PROCEDURE — 99204 OFFICE O/P NEW MOD 45 MIN: CPT | Performed by: STUDENT IN AN ORGANIZED HEALTH CARE EDUCATION/TRAINING PROGRAM

## 2024-10-17 PROCEDURE — 93005 ELECTROCARDIOGRAM TRACING: CPT | Performed by: EMERGENCY MEDICINE

## 2024-10-17 PROCEDURE — 25510000001 IOPAMIDOL PER 1 ML: Performed by: EMERGENCY MEDICINE

## 2024-10-17 PROCEDURE — 25510000001 IOPAMIDOL PER 1 ML: Performed by: INTERNAL MEDICINE

## 2024-10-17 PROCEDURE — 36415 COLL VENOUS BLD VENIPUNCTURE: CPT

## 2024-10-17 PROCEDURE — 85730 THROMBOPLASTIN TIME PARTIAL: CPT | Performed by: NURSE PRACTITIONER

## 2024-10-17 PROCEDURE — 25810000003 LACTATED RINGERS SOLUTION: Performed by: NURSE PRACTITIONER

## 2024-10-17 PROCEDURE — 86850 RBC ANTIBODY SCREEN: CPT | Performed by: NURSE PRACTITIONER

## 2024-10-17 PROCEDURE — 25810000003 LACTATED RINGERS SOLUTION: Performed by: STUDENT IN AN ORGANIZED HEALTH CARE EDUCATION/TRAINING PROGRAM

## 2024-10-17 PROCEDURE — 85610 PROTHROMBIN TIME: CPT | Performed by: NURSE PRACTITIONER

## 2024-10-17 PROCEDURE — 82948 REAGENT STRIP/BLOOD GLUCOSE: CPT

## 2024-10-17 PROCEDURE — 87040 BLOOD CULTURE FOR BACTERIA: CPT | Performed by: NURSE PRACTITIONER

## 2024-10-17 PROCEDURE — 25010000002 PIPERACILLIN SOD-TAZOBACTAM PER 1 G: Performed by: STUDENT IN AN ORGANIZED HEALTH CARE EDUCATION/TRAINING PROGRAM

## 2024-10-17 PROCEDURE — 86900 BLOOD TYPING SEROLOGIC ABO: CPT | Performed by: NURSE PRACTITIONER

## 2024-10-17 PROCEDURE — 80053 COMPREHEN METABOLIC PANEL: CPT | Performed by: EMERGENCY MEDICINE

## 2024-10-17 PROCEDURE — 25810000003 SODIUM CHLORIDE 0.9 % SOLUTION: Performed by: EMERGENCY MEDICINE

## 2024-10-17 PROCEDURE — 99285 EMERGENCY DEPT VISIT HI MDM: CPT

## 2024-10-17 PROCEDURE — 84484 ASSAY OF TROPONIN QUANT: CPT | Performed by: EMERGENCY MEDICINE

## 2024-10-17 PROCEDURE — 81003 URINALYSIS AUTO W/O SCOPE: CPT | Performed by: NURSE PRACTITIONER

## 2024-10-17 PROCEDURE — 74174 CTA ABD&PLVS W/CONTRAST: CPT

## 2024-10-17 PROCEDURE — 99233 SBSQ HOSP IP/OBS HIGH 50: CPT | Performed by: STUDENT IN AN ORGANIZED HEALTH CARE EDUCATION/TRAINING PROGRAM

## 2024-10-17 PROCEDURE — 93005 ELECTROCARDIOGRAM TRACING: CPT | Performed by: STUDENT IN AN ORGANIZED HEALTH CARE EDUCATION/TRAINING PROGRAM

## 2024-10-17 PROCEDURE — 25010000002 CEFTRIAXONE PER 250 MG: Performed by: NURSE PRACTITIONER

## 2024-10-17 PROCEDURE — 83690 ASSAY OF LIPASE: CPT | Performed by: NURSE PRACTITIONER

## 2024-10-17 PROCEDURE — 93010 ELECTROCARDIOGRAM REPORT: CPT | Performed by: INTERNAL MEDICINE

## 2024-10-17 RX ORDER — SODIUM CHLORIDE 0.9 % (FLUSH) 0.9 %
10 SYRINGE (ML) INJECTION AS NEEDED
Status: DISCONTINUED | OUTPATIENT
Start: 2024-10-17 | End: 2024-10-23 | Stop reason: HOSPADM

## 2024-10-17 RX ORDER — LISINOPRIL 20 MG/1
20 TABLET ORAL DAILY
Status: DISCONTINUED | OUTPATIENT
Start: 2024-10-18 | End: 2024-10-23 | Stop reason: HOSPADM

## 2024-10-17 RX ORDER — ALLOPURINOL 300 MG/1
1 TABLET ORAL DAILY
COMMUNITY

## 2024-10-17 RX ORDER — ISOSORBIDE MONONITRATE 30 MG/1
30 TABLET, EXTENDED RELEASE ORAL EVERY MORNING
Status: DISCONTINUED | OUTPATIENT
Start: 2024-10-18 | End: 2024-10-18

## 2024-10-17 RX ORDER — LISINOPRIL 20 MG/1
1 TABLET ORAL DAILY
COMMUNITY

## 2024-10-17 RX ORDER — SODIUM CHLORIDE 9 MG/ML
75 INJECTION, SOLUTION INTRAVENOUS CONTINUOUS
Status: DISCONTINUED | OUTPATIENT
Start: 2024-10-17 | End: 2024-10-19

## 2024-10-17 RX ORDER — INSULIN LISPRO 100 [IU]/ML
2-7 INJECTION, SOLUTION INTRAVENOUS; SUBCUTANEOUS
Status: DISCONTINUED | OUTPATIENT
Start: 2024-10-17 | End: 2024-10-23 | Stop reason: HOSPADM

## 2024-10-17 RX ORDER — IOPAMIDOL 755 MG/ML
100 INJECTION, SOLUTION INTRAVASCULAR
Status: COMPLETED | OUTPATIENT
Start: 2024-10-17 | End: 2024-10-17

## 2024-10-17 RX ORDER — ONDANSETRON 2 MG/ML
4 INJECTION INTRAMUSCULAR; INTRAVENOUS EVERY 6 HOURS PRN
Status: DISCONTINUED | OUTPATIENT
Start: 2024-10-17 | End: 2024-10-23 | Stop reason: HOSPADM

## 2024-10-17 RX ORDER — DEXTROSE MONOHYDRATE 25 G/50ML
50 INJECTION, SOLUTION INTRAVENOUS
Status: DISCONTINUED | OUTPATIENT
Start: 2024-10-17 | End: 2024-10-23 | Stop reason: HOSPADM

## 2024-10-17 RX ORDER — ONDANSETRON 2 MG/ML
4 INJECTION INTRAMUSCULAR; INTRAVENOUS EVERY 6 HOURS PRN
Status: DISCONTINUED | OUTPATIENT
Start: 2024-10-17 | End: 2024-10-23

## 2024-10-17 RX ORDER — DEXTROSE MONOHYDRATE 25 G/50ML
25 INJECTION, SOLUTION INTRAVENOUS
Status: DISCONTINUED | OUTPATIENT
Start: 2024-10-17 | End: 2024-10-23 | Stop reason: HOSPADM

## 2024-10-17 RX ORDER — NITROGLYCERIN 0.4 MG/1
0.4 TABLET SUBLINGUAL
Status: DISCONTINUED | OUTPATIENT
Start: 2024-10-17 | End: 2024-10-23 | Stop reason: HOSPADM

## 2024-10-17 RX ORDER — IBUPROFEN 600 MG/1
1 TABLET ORAL
Status: DISCONTINUED | OUTPATIENT
Start: 2024-10-17 | End: 2024-10-23 | Stop reason: HOSPADM

## 2024-10-17 RX ORDER — ACETAMINOPHEN 325 MG/1
650 TABLET ORAL EVERY 6 HOURS PRN
Status: DISCONTINUED | OUTPATIENT
Start: 2024-10-17 | End: 2024-10-23 | Stop reason: HOSPADM

## 2024-10-17 RX ORDER — ONDANSETRON 2 MG/ML
4 INJECTION INTRAMUSCULAR; INTRAVENOUS ONCE
Status: COMPLETED | OUTPATIENT
Start: 2024-10-17 | End: 2024-10-17

## 2024-10-17 RX ORDER — PANTOPRAZOLE SODIUM 40 MG/10ML
40 INJECTION, POWDER, LYOPHILIZED, FOR SOLUTION INTRAVENOUS ONCE
Status: COMPLETED | OUTPATIENT
Start: 2024-10-17 | End: 2024-10-17

## 2024-10-17 RX ORDER — MORPHINE SULFATE 2 MG/ML
2 INJECTION, SOLUTION INTRAMUSCULAR; INTRAVENOUS ONCE
Status: COMPLETED | OUTPATIENT
Start: 2024-10-17 | End: 2024-10-17

## 2024-10-17 RX ORDER — NICOTINE POLACRILEX 4 MG
15 LOZENGE BUCCAL
Status: DISCONTINUED | OUTPATIENT
Start: 2024-10-17 | End: 2024-10-23 | Stop reason: HOSPADM

## 2024-10-17 RX ORDER — GLIMEPIRIDE 2 MG/1
1 TABLET ORAL
COMMUNITY

## 2024-10-17 RX ADMIN — SODIUM CHLORIDE, POTASSIUM CHLORIDE, SODIUM LACTATE AND CALCIUM CHLORIDE 1000 ML: 600; 310; 30; 20 INJECTION, SOLUTION INTRAVENOUS at 13:17

## 2024-10-17 RX ADMIN — CEFTRIAXONE SODIUM 1000 MG: 1 INJECTION, POWDER, FOR SOLUTION INTRAMUSCULAR; INTRAVENOUS at 13:41

## 2024-10-17 RX ADMIN — IOPAMIDOL 85 ML: 755 INJECTION, SOLUTION INTRAVENOUS at 22:17

## 2024-10-17 RX ADMIN — PANTOPRAZOLE SODIUM 8 MG/HR: 40 INJECTION, POWDER, FOR SOLUTION INTRAVENOUS at 14:20

## 2024-10-17 RX ADMIN — MORPHINE SULFATE 2 MG: 2 INJECTION, SOLUTION INTRAMUSCULAR; INTRAVENOUS at 17:26

## 2024-10-17 RX ADMIN — DEXTROSE MONOHYDRATE 50 ML: 25 INJECTION, SOLUTION INTRAVENOUS at 21:42

## 2024-10-17 RX ADMIN — DEXTROSE MONOHYDRATE 50 ML: 25 INJECTION, SOLUTION INTRAVENOUS at 15:46

## 2024-10-17 RX ADMIN — SODIUM CHLORIDE, POTASSIUM CHLORIDE, SODIUM LACTATE AND CALCIUM CHLORIDE 1000 ML: 600; 310; 30; 20 INJECTION, SOLUTION INTRAVENOUS at 20:34

## 2024-10-17 RX ADMIN — PANTOPRAZOLE SODIUM 8 MG/HR: 40 INJECTION, POWDER, FOR SOLUTION INTRAVENOUS at 22:55

## 2024-10-17 RX ADMIN — SODIUM CHLORIDE, POTASSIUM CHLORIDE, SODIUM LACTATE AND CALCIUM CHLORIDE 1000 ML: 600; 310; 30; 20 INJECTION, SOLUTION INTRAVENOUS at 17:26

## 2024-10-17 RX ADMIN — SODIUM CHLORIDE 500 ML: 9 INJECTION, SOLUTION INTRAVENOUS at 11:52

## 2024-10-17 RX ADMIN — PIPERACILLIN AND TAZOBACTAM 3.38 G: 3; .375 INJECTION, POWDER, FOR SOLUTION INTRAVENOUS at 23:12

## 2024-10-17 RX ADMIN — ONDANSETRON 4 MG: 2 INJECTION INTRAMUSCULAR; INTRAVENOUS at 11:11

## 2024-10-17 RX ADMIN — PANTOPRAZOLE SODIUM 40 MG: 40 INJECTION, POWDER, FOR SOLUTION INTRAVENOUS at 11:11

## 2024-10-17 RX ADMIN — SODIUM CHLORIDE 75 ML/HR: 9 INJECTION, SOLUTION INTRAVENOUS at 23:03

## 2024-10-17 RX ADMIN — IOPAMIDOL 85 ML: 755 INJECTION, SOLUTION INTRAVENOUS at 12:13

## 2024-10-17 NOTE — PROGRESS NOTES
Clinical Pharmacy Services: Medication History    Kevon Orantes is a 74 y.o. male presenting to [facility] for   Chief Complaint   Patient presents with    Shortness of Breath    Vomiting Blood       He  has a past medical history of Aneurysm of artery of lower extremity, Arteriolosclerosis, Arthritis, CAD (coronary artery disease), Carotid artery stenosis, Diabetes mellitus, Edema, H/O cerebral artery stenosis, Health care maintenance, Hyperlipidemia, Hypertension, Intermittent claudication, Peripheral vascular disease, PVD (peripheral vascular disease), and Stroke syndrome.    Allergies as of 10/17/2024    (No Known Allergies)       Medication information was obtained from: Patient & Spouse   Pharmacy and Phone Number:     Prior to Admission Medications       Prescriptions Last Dose Informant Patient Reported? Taking?    allopurinol (ZYLOPRIM) 300 MG tablet 10/17/2024 Self, Spouse/Significant Other Yes Yes    Take 1 tablet by mouth Daily.    aspirin 81 MG EC tablet 10/16/2024 Spouse/Significant Other, Self No Yes    Take 1 tablet by mouth Daily.    Patient taking differently:  Take 1 tablet by mouth Every Night.    atorvastatin (LIPITOR) 40 MG tablet 10/16/2024 Spouse/Significant Other, Self Yes Yes    Take 1 tablet by mouth Every Night.    colchicine 0.6 MG tablet  Self, Spouse/Significant Other Yes Yes    Take 1 tablet by mouth As Needed (Gout).    glimepiride (AMARYL) 2 MG tablet 10/17/2024 Self, Spouse/Significant Other Yes Yes    Take 1 tablet by mouth Every Morning Before Breakfast.    isosorbide mononitrate (IMDUR) 30 MG 24 hr tablet 10/17/2024 Self, Spouse/Significant Other Yes Yes    Take 1 tablet by mouth Every Morning.    lisinopril (PRINIVIL,ZESTRIL) 20 MG tablet  Self, Spouse/Significant Other Yes Yes    Take 1 tablet by mouth Daily.    metFORMIN (GLUCOPHAGE) 1000 MG tablet 10/17/2024 Self, Spouse/Significant Other Yes Yes    Take 1 tablet by mouth 2 (Two) Times a Day.     triamterene-hydrochlorothiazide (DYAZIDE) 37.5-25 MG per capsule Not Taking Self Yes No    Take 1 capsule by mouth Every Morning.    Patient not taking:  Reported on 10/17/2024              Medication notes: Patient stated he is not taking Dyazide or farxiga. But is taking lisinopril daily.     This medication list is complete to the best of my knowledge as of 10/17/2024    Please call if questions.    .dc  10/17/2024 11:51 EDT

## 2024-10-17 NOTE — ED PROVIDER NOTES
MD ATTESTATION NOTE    SHARED VISIT: This visit was performed by BOTH a physician and an APC. The substantive portion of the medical decision making was performed by this attesting physician who made or approved the management plan and takes responsibility for patient management. All studies in the APC note (if performed) were independently interpreted by me.     The BERTA and I have discussed this patient's history, physical exam, and treatment plan.  I have reviewed the documentation and affirm the documentation and agree with the treatment and plan.  The attached note describes my personal findings.      Independent Historians: Patient    A complete HPI/ROS/PMH/PSH/SH/FH are unobtainable due to: None    Chronic or social conditions impacting patient care (social determinants of health): None    Kevon Orantes is a 74 y.o. male with history of coronary artery disease and gout who presents to the ED c/o acute episode of bloody emesis.  Patient had been feeling well this morning and was sitting at his desk when he suddenly got nauseous and had 3 back-to-back emesis that were blood-tinged.  Patient says he did eat pizza last night and that he had just finished a steroid pack for his gout.  Patient denies any cough, fevers, black or bloody stools.  Patient denies any anticoagulation other than a baby aspirin.  Patient denies any history of GI bleeding in the past. After vomiting patient felt lightheaded and short of breath.          On exam:  GENERAL: Cooperative and conversant male, alert, no acute distress  SKIN: Warm, dry  HENT: Normocephalic, atraumatic  EYES: no scleral icterus  CV: regular rhythm, regular rate  RESPIRATORY: normal effort, lungs clear, no wheezing  ABDOMEN: soft, mild epigastric tenderness to palpation with no rebound and no guarding, nondistended  MUSCULOSKELETAL: no deformity  NEURO: alert, moves all extremities, follows commands                                                              Labs  Recent Results (from the past 24 hours)   ECG 12 Lead ED Triage Standing Order; SOA    Collection Time: 10/17/24 10:25 AM   Result Value Ref Range    QT Interval 360 ms    QTC Interval 413 ms   Comprehensive Metabolic Panel    Collection Time: 10/17/24 10:59 AM    Specimen: Blood   Result Value Ref Range    Glucose 170 (H) 65 - 99 mg/dL    BUN 34 (H) 8 - 23 mg/dL    Creatinine 1.59 (H) 0.76 - 1.27 mg/dL    Sodium 137 136 - 145 mmol/L    Potassium 4.2 3.5 - 5.2 mmol/L    Chloride 100 98 - 107 mmol/L    CO2 26.5 22.0 - 29.0 mmol/L    Calcium 9.7 8.6 - 10.5 mg/dL    Total Protein 7.2 6.0 - 8.5 g/dL    Albumin 3.8 3.5 - 5.2 g/dL    ALT (SGPT) 20 1 - 41 U/L    AST (SGOT) 17 1 - 40 U/L    Alkaline Phosphatase 97 39 - 117 U/L    Total Bilirubin 0.3 0.0 - 1.2 mg/dL    Globulin 3.4 gm/dL    A/G Ratio 1.1 g/dL    BUN/Creatinine Ratio 21.4 7.0 - 25.0    Anion Gap 10.5 5.0 - 15.0 mmol/L    eGFR 45.0 (L) >60.0 mL/min/1.73   BNP    Collection Time: 10/17/24 10:59 AM    Specimen: Blood   Result Value Ref Range    proBNP 265.0 0.0 - 1,800.0 pg/mL   Single High Sensitivity Troponin T    Collection Time: 10/17/24 10:59 AM    Specimen: Blood   Result Value Ref Range    HS Troponin T 22 (H) <22 ng/L   Green Top (Gel)    Collection Time: 10/17/24 10:59 AM   Result Value Ref Range    Extra Tube Hold for add-ons.    Lavender Top    Collection Time: 10/17/24 10:59 AM   Result Value Ref Range    Extra Tube hold for add-on    Gold Top - SST    Collection Time: 10/17/24 10:59 AM   Result Value Ref Range    Extra Tube Hold for add-ons.    Light Blue Top    Collection Time: 10/17/24 10:59 AM   Result Value Ref Range    Extra Tube Hold for add-ons.    CBC Auto Differential    Collection Time: 10/17/24 10:59 AM    Specimen: Blood   Result Value Ref Range    WBC 17.53 (H) 3.40 - 10.80 10*3/mm3    RBC 4.56 4.14 - 5.80 10*6/mm3    Hemoglobin 13.8 13.0 - 17.7 g/dL    Hematocrit 44.1 37.5 - 51.0 %    MCV 96.7 79.0 - 97.0 fL    MCH 30.3 26.6  - 33.0 pg    MCHC 31.3 (L) 31.5 - 35.7 g/dL    RDW 13.4 12.3 - 15.4 %    RDW-SD 47.9 37.0 - 54.0 fl    MPV 11.2 6.0 - 12.0 fL    Platelets 339 140 - 450 10*3/mm3    Neutrophil % 74.7 42.7 - 76.0 %    Lymphocyte % 14.9 (L) 19.6 - 45.3 %    Monocyte % 7.6 5.0 - 12.0 %    Eosinophil % 1.9 0.3 - 6.2 %    Basophil % 0.3 0.0 - 1.5 %    Immature Grans % 0.6 (H) 0.0 - 0.5 %    Neutrophils, Absolute 13.09 (H) 1.70 - 7.00 10*3/mm3    Lymphocytes, Absolute 2.62 0.70 - 3.10 10*3/mm3    Monocytes, Absolute 1.33 (H) 0.10 - 0.90 10*3/mm3    Eosinophils, Absolute 0.33 0.00 - 0.40 10*3/mm3    Basophils, Absolute 0.06 0.00 - 0.20 10*3/mm3    Immature Grans, Absolute 0.10 (H) 0.00 - 0.05 10*3/mm3    nRBC 0.0 0.0 - 0.2 /100 WBC   Protime-INR    Collection Time: 10/17/24 10:59 AM    Specimen: Blood   Result Value Ref Range    Protime 14.2 11.7 - 14.2 Seconds    INR 1.08 0.90 - 1.10   aPTT    Collection Time: 10/17/24 10:59 AM    Specimen: Blood   Result Value Ref Range    PTT 26.3 22.7 - 35.4 seconds   Lipase    Collection Time: 10/17/24 10:59 AM    Specimen: Blood   Result Value Ref Range    Lipase 38 13 - 60 U/L   Lactic Acid, Plasma    Collection Time: 10/17/24 11:51 AM    Specimen: Blood   Result Value Ref Range    Lactate 2.4 (C) 0.5 - 2.0 mmol/L   STAT Lactic Acid, Reflex    Collection Time: 10/17/24  1:25 PM    Specimen: Arm, Left; Blood   Result Value Ref Range    Lactate 2.7 (C) 0.5 - 2.0 mmol/L   Hemoglobin    Collection Time: 10/17/24  2:13 PM    Specimen: Blood   Result Value Ref Range    Hemoglobin 12.7 (L) 13.0 - 17.7 g/dL   Type & Screen    Collection Time: 10/17/24  2:13 PM    Specimen: Blood   Result Value Ref Range    ABO Type A     RH type Positive     Antibody Screen Negative     T&S Expiration Date 10/20/2024 11:59:59 PM    POC Glucose Once    Collection Time: 10/17/24  3:38 PM    Specimen: Blood   Result Value Ref Range    Glucose 55 (L) 70 - 130 mg/dL       Radiology  CT Abdomen Pelvis With Contrast    Result  Date: 10/17/2024  CT ABDOMEN PELVIS W CONTRAST-  DATE OF EXAM: 10/17/2024 12:05 PM  INDICATION: Nausea and vomiting with epigastric pain. Hematemesis.  COMPARISON: Chest radiographs 10/17/2024 and 10/29/2019.  TECHNIQUE: Multiple contiguous axial images were acquired through the abdomen and pelvis following the intravenous administration of 85 mL of Isovue-370. Reformatted coronal and sagittal sequences were also reviewed. Radiation dose reduction techniques were utilized, including automated exposure control and exposure modulation based on body size.  FINDINGS: Chronic elevation of the right hemidiaphragm with multifocal right perihilar and bibasilar subsegmental atelectasis and/or scarring. Likely benign sub-6 mm pulmonary nodule in the lingula (axial series 2 image 23). Partially imaged mild to moderate chronic emphysematous changes in both lungs. Severe calcified coronary artery disease.  The liver, gallbladder, spleen, pancreas, and adrenal glands are unremarkable. Simple appearing bilateral renal cysts. Scarring at the interpolar left kidney. The urinary bladder is nondistended. Diffuse urinary bladder wall thickening could be accentuated by under distention. Dystrophic appearing calcifications in the prostate gland.  Mild diffuse gastric wall thickening could be accentuated by underdistention but could reflect a nonspecific gastritis. Mild to moderate colorectal stool. Colonic diverticula, without CT evidence of diverticulitis. No bowel obstruction or significant bowel wall thickening. The appendix is normal.  No free fluid in the abdomen or pelvis. No free intraperitoneal air. Moderate calcified atherosclerotic disease in the abdominal aorta and its distal branches without aneurysm. 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  Small fat-containing bilateral inguinal hernias. Mild to moderate multilevel lumbar spondylosis.  Flowing multilevel lower thoracic anterior osteophyte formation. Mild to moderate bilateral hip and SI joint DJD with left SI joint bridging osteophyte formation. Mild to moderate DJD at the pubic symphysis. No acute osseous abnormality or concerning osseous lesion.       1. Mild diffuse gastric wall thickening could be accentuated by underdistention but could reflect a nonspecific gastritis. Consider correlating with endoscopy if clinically indicated. 2. 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 3. Colonic diverticula, without CT evidence of diverticulitis.  This report was finalized on 10/17/2024 1:04 PM by Thad Pierre MD on Workstation: [id number]      XR Chest 1 View    Result Date: 10/17/2024  XR CHEST 1 VW-  HISTORY: 74-year-old male with shortness of breath. Vomiting bright red blood.  FINDINGS: There is elevation of the right hemidiaphragm, unchanged since previous radiograph 10/29/2019. There is no evidence for effusions or CHF. Given the elevation of the right hemidiaphragm, follow-up with 2 views of the chest is recommended.  This report was finalized on 10/17/2024 11:12 AM by Dr. Susie Contreras M.D on Workstation: [id number]       Medical Decision Making:  ED Course as of 10/17/24 1550   Thu Oct 17, 2024   1100 Per my independent interpretation of the chest x-ray, there is no obvious pneumothorax.   [JG]   1127 WBC(!): 17.53 [JG]   1127 RBC: 4.56 [JG]   1127 Hemoglobin: 13.8 [JG]   1127 Hematocrit: 44.1 [JG]   1128 MCV: 96.7 [JG]   1128 MCH: 30.3 [JG]   1128 MCHC(!): 31.3 [JG]   1128 Platelets: 339 [JG]   1136 EKG ER MD interpretation   Time: 10: 25  Rhythm and rate: Normal sinus rhythm at a rate of 79  Axis: Normal  P waves: Normal  QRS complexes: Normal  ST segments: no elevation nor depressions  T waves: no flattening or inversions  Q wave in lead III [AR]   1142 WBC(!): 17.53  Patient got a steroid shot and just  finished a steroid pack for a gout flareup. [AR]   1143 BUN(!): 34 [AR]   1143 Creatinine(!): 1.59  Last creatinine in system was 1.26 but that was in 2022 [AR]   1143 HS Troponin T(!): 22 [AR]   1146 Glucose(!): 170 [JG]   1146 BUN(!): 34 [JG]   1146 Creatinine(!): 1.59 [JG]   1146 Sodium: 137 [JG]   1146 Potassium: 4.2 [JG]   1146 ALT (SGPT): 20 [JG]   1146 AST (SGOT): 17 [JG]   1241 Lactate(!!): 2.4 [JG]   1341 CT scan reviewed and results reviewed.  Continue plan for PPI drip.  I discussed with patient the need for admission for probable GI consultation given signs of gastritis on CT scan with episode of large hematemesis earlier today.  Will discuss with obs unit. [AR]   1347 I discussed patient's case with our provider in the observation unit who is amenable to admitting the patient for further care. [AR]      ED Course User Index  [AR] Caridad Smith MD  [JG] Ashley Deleon APRN       MDM: This patient presents with symptoms concerning for acute, upper GI bleed.  Differential diagnoses includes peptic ulcer disease (most common) versus less likely gastritis versus Haley-Major tear versus AVM. This patient´s presentation is not consistent with esophageal or gastric variceal bleeding or Boerhaave´s syndrome but those are included on the differential. The patient´s presentation is not consistent with other etiologies upper GI bleeding at this time. No red flag features or high risk bleeding. No evidence of hemorrhagic shock.    Plan to check labs to evaluate the extent of bleeding, including H/H. Will initiate treatment with PPI. No indication for octreotide or antibiotics given low likelihood of variceal bleeding from portal hypertension and cirrhosis.   Plan: labs, LFTs, close hemodynamic monitoring, serial reassessment, PPI therapy    This patient´s Brian-Blatchford Bleeding (GBS) score: 5.   The patient gets points for:   BUN (<18.2 = 0, 18.2-22.3 = +2, 22.4-28 = +3, 28-70 = +4, >70 =  +6)  Hemoglobin (for men >13 = 0, 12-13 = +1, 10-12 = +3, <10 = +6 and for women >12 = 0, 10-12 = +1, <10 = +6)  SBP (> or = to 110 zero, 100-109 = +1,  90-99 = +2, <90 = +3)  Pulse > or = to 100 gets +1  Melena present = +1  Presents with syncope = +2  Liver disease history = +2  Heart failure present = +2  A low risk score is 0.  Any score higher than 0 is high risk for needing intervention like transfusion, endoscopy, or surgery.    Based on this well validated study, the patient is ``high risk´´ for needing a medical intervention to include transfusion, endoscopy or surgery.         Procedures:  Procedures        PPE: I followed hospital protocols for proper PPE based on patient presentation including use of N95 mask for suspected infectious respiratory conditions.  Proper hand hygiene was performed both before and after the patient encounter.          Diagnosis  Final diagnoses:   Hematemesis with nausea   Generalized weakness   ELVIN (acute kidney injury)       Note Disclaimer: At [facility], we believe that sharing information builds trust and better relationships. You are receiving this note because you recently visited [facility]. It is possible you will see health information before a provider has talked with you about it. This kind of information can be easy to misunderstand. To help you fully understand what it means for your health, we urge you to discuss this note with your provider.       Caridad Smith MD  10/17/24 [id number]

## 2024-10-17 NOTE — ED NOTES
Nursing report ED to floor  Kevon Orantes  74 y.o.  male    HPI :  HPI  Stated Reason for Visit: SOA    Chief Complaint  Chief Complaint   Patient presents with    Shortness of Breath    Vomiting Blood       Admitting doctor:   Angelo Zavaleta MD    Admitting diagnosis:   The primary encounter diagnosis was Hematemesis with nausea. Diagnoses of Generalized weakness and ELVIN (acute kidney injury) were also pertinent to this visit.    Code status:   Current Code Status       Date Active Code Status Order ID Comments User Context       Prior            Allergies:   Patient has no known allergies.    Isolation:   No active isolations    Intake and Output    Intake/Output Summary (Last 24 hours) at 10/17/2024 1350  Last data filed at 10/17/2024 1318  Gross per 24 hour   Intake 500 ml   Output --   Net 500 ml       Weight:   There were no vitals filed for this visit.    Most recent vitals:   Vitals:    10/17/24 1114 10/17/24 1229 10/17/24 1249 10/17/24 1259   BP: 101/48  110/53 109/54   Pulse: 79 77 75 80   Resp:       Temp:       SpO2: 95%  95%        Active LDAs/IV Access:   Lines, Drains & Airways       Active LDAs       Name Placement date Placement time Site Days    Peripheral IV 10/17/24 1108 Right Antecubital 10/17/24  1108  Antecubital  less than 1                    Labs (abnormal labs have a star):   Labs Reviewed   COMPREHENSIVE METABOLIC PANEL - Abnormal; Notable for the following components:       Result Value    Glucose 170 (*)     BUN 34 (*)     Creatinine 1.59 (*)     eGFR 45.0 (*)     All other components within normal limits    Narrative:     GFR Normal >60  Chronic Kidney Disease <60  Kidney Failure <15    The GFR formula is only valid for adults with stable renal function between ages 18 and 70.   SINGLE HS TROPONIN T - Abnormal; Notable for the following components:    HS Troponin T 22 (*)     All other components within normal limits    Narrative:     High Sensitive Troponin T Reference Range:  <14.0  ng/L- Negative Female for AMI  <22.0 ng/L- Negative Male for AMI  >=14 - Abnormal Female indicating possible myocardial injury.  >=22 - Abnormal Male indicating possible myocardial injury.   Clinicians would have to utilize clinical acumen, EKG, Troponin, and serial changes to determine if it is an Acute Myocardial Infarction or myocardial injury due to an underlying chronic condition.        CBC WITH AUTO DIFFERENTIAL - Abnormal; Notable for the following components:    WBC 17.53 (*)     MCHC 31.3 (*)     Lymphocyte % 14.9 (*)     Immature Grans % 0.6 (*)     Neutrophils, Absolute 13.09 (*)     Monocytes, Absolute 1.33 (*)     Immature Grans, Absolute 0.10 (*)     All other components within normal limits   LACTIC ACID, PLASMA - Abnormal; Notable for the following components:    Lactate 2.4 (*)     All other components within normal limits   BNP (IN-HOUSE) - Normal    Narrative:     This assay is used as an aid in the diagnosis of individuals suspected of having heart failure. It can be used as an aid in the diagnosis of acute decompensated heart failure (ADHF) in patients presenting with signs and symptoms of ADHF to the emergency department (ED). In addition, NT-proBNP of <300 pg/mL indicates ADHF is not likely.    Age Range Result Interpretation  NT-proBNP Concentration (pg/mL:      <50             Positive            >450                   Gray                 300-450                    Negative             <300    50-75           Positive            >900                  Gray                300-900                  Negative            <300      >75             Positive            >1800                  Gray                300-1800                  Negative            <300   PROTIME-INR - Normal   APTT - Normal   LIPASE - Normal   BLOOD CULTURE   BLOOD CULTURE   RAINBOW DRAW    Narrative:     The following orders were created for panel order [place] Draw.  Procedure                               Abnormality          Status                     ---------                               -----------         ------                     Green Top (Gel)[[id number]]                                  Final result               Lavender Top[[id number]]                                     Final result               Gold Top - SST[[id number]]                                   Final result               Light Blue Top[[id number]]                                   Final result                 Please view results for these tests on the individual orders.   URINALYSIS W/ MICROSCOPIC IF INDICATED (NO CULTURE)   LACTIC ACID, REFLEX   HEMOGLOBIN   HEMOGLOBIN   TYPE AND SCREEN   CBC AND DIFFERENTIAL    Narrative:     The following orders were created for panel order CBC & Differential.  Procedure                               Abnormality         Status                     ---------                               -----------         ------                     CBC Auto Differential[[id number]]        Abnormal            Final result                 Please view results for these tests on the individual orders.   GREEN TOP   LAVENDER TOP   GOLD TOP - SST   LIGHT BLUE TOP       EKG:   ECG 12 Lead ED Triage Standing Order; SOA   Preliminary Result   HEART RATE=79  bpm   RR Jbjnyzya=611  ms   NV Ckdsflhd=313  ms   P Horizontal Axis=-19  deg   P Front Axis=28  deg   QRSD Interval=80  ms   QT Dzroawpw=209  ms   DVrY=726  ms   QRS Axis=-26  deg   T Wave Axis=48  deg   - ABNORMAL ECG -   Sinus rhythm   Inferior infarct, old   Baseline wander in lead(s) V3   Date and Time of Study:2024-10-17 10:25:21          Meds given in ED:   Medications   sodium chloride 0.9 % flush 10 mL (has no administration in time range)   pantoprazole (PROTONIX) 40 mg in sodium chloride 0.9 % 100 mL (0.4 mg/mL) MBP (has no administration in time range)   lactated ringers bolus 1,000 mL (1,000 mL Intravenous New Bag 10/17/24 1317)   cefTRIAXone (ROCEPHIN) 1,000 mg in sodium chloride 0.9 %  100 mL MBP (1,000 mg Intravenous New Bag 10/17/24 1341)   ondansetron (ZOFRAN) injection 4 mg (has no administration in time range)   nitroglycerin (NITROSTAT) SL tablet 0.4 mg (has no administration in time range)   ondansetron (ZOFRAN) injection 4 mg (4 mg Intravenous Given 10/17/24 1111)   pantoprazole (PROTONIX) injection 40 mg (40 mg Intravenous Given 10/17/24 1111)   sodium chloride 0.9 % bolus 500 mL (0 mL Intravenous Stopped 10/17/24 1318)   iopamidol (ISOVUE-370) 76 % injection 100 mL (85 mL Intravenous Given 10/17/24 1213)       Imaging results:  CT Abdomen Pelvis With Contrast    Result Date: 10/17/2024   1. Mild diffuse gastric wall thickening could be accentuated by underdistention but could reflect a nonspecific gastritis. Consider correlating with endoscopy if clinically indicated. 2. 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 3. Colonic diverticula, without CT evidence of diverticulitis.  This report was finalized on 10/17/2024 1:04 PM by Thad Pierre MD on Workstation: [id number]       Ambulatory status:   - as tolerated    Social issues:   Social History     Socioeconomic History    Marital status:    Tobacco Use    Smoking status: Former     Current packs/day: 0.00     Types: Cigarettes     Quit date:      Years since quittin.8    Smokeless tobacco: Never    Tobacco comments:     Caffeine 2 Cups/ Day   Substance and Sexual Activity    Alcohol use: Yes     Alcohol/week: 3.0 standard drinks of alcohol     Types: 2 Cans of beer, 1 Shots of liquor per week     Comment: social drinker    Drug use: No    Sexual activity: Defer       Peripheral Neurovascular  Peripheral Neurovascular (Adult)  Peripheral Neurovascular WDL: WDL    Neuro Cognitive  Neuro Cognitive (Adult)  Cognitive/Neuro/Behavioral WDL: WDL, orientation, speech, level of consciousness  Level of Consciousness: Alert  Orientation: oriented  x 4  Speech: clear, spontaneous, logical    Learning  Learning Assessment  Learning Readiness and Ability: no barriers identified  Education Provided  Person Taught: patient  Teaching Method: verbal instruction  Teaching Focus: symptom/problem overview, diagnostic test  Education Outcome Evaluation: verbalizes understanding, acceptance expressed    Respiratory  Respiratory WDL  Respiratory WDL: WDL, rhythm/pattern  Rhythm/Pattern, Respiratory: pattern regular, depth regular, unlabored    Abdominal Pain       Pain Assessments  Pain (Adult)  Preferred Pain Scale: FACES (Rehman-Baker FACES Pain Rating Scale)  FACES Pain Rating: Rest: 6-->hurts even more    NIH Stroke Scale       Dyan Centeno RN  10/17/24 13:50 EDT

## 2024-10-17 NOTE — PLAN OF CARE
Goal Outcome Evaluation:   Took over patient from another nurse at approximately 1600. Patient is receiving Protonix IV at 20mL/hr. A 1000L bolus of lactated ringers was given as well. Patient continues to rest during care.
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
## 2024-10-18 ENCOUNTER — ANESTHESIA (OUTPATIENT)
Dept: GASTROENTEROLOGY | Facility: HOSPITAL | Age: 74
End: 2024-10-18
Payer: MEDICARE

## 2024-10-18 ENCOUNTER — APPOINTMENT (OUTPATIENT)
Dept: GENERAL RADIOLOGY | Facility: HOSPITAL | Age: 74
End: 2024-10-18
Payer: MEDICARE

## 2024-10-18 ENCOUNTER — ANESTHESIA EVENT (OUTPATIENT)
Dept: GASTROENTEROLOGY | Facility: HOSPITAL | Age: 74
End: 2024-10-18
Payer: MEDICARE

## 2024-10-18 PROBLEM — R10.9 ABDOMINAL PAIN: Status: ACTIVE | Noted: 2024-10-18

## 2024-10-18 PROBLEM — K55.1 SUPERIOR MESENTERIC ARTERY STENOSIS: Status: ACTIVE | Noted: 2024-10-18

## 2024-10-18 PROBLEM — E87.20 LACTIC ACIDOSIS: Status: ACTIVE | Noted: 2024-10-18

## 2024-10-18 PROBLEM — N17.9 AKI (ACUTE KIDNEY INJURY): Status: ACTIVE | Noted: 2024-10-18

## 2024-10-18 PROBLEM — R53.1 GENERALIZED WEAKNESS: Status: ACTIVE | Noted: 2024-10-18

## 2024-10-18 PROBLEM — R65.10 SIRS (SYSTEMIC INFLAMMATORY RESPONSE SYNDROME): Status: ACTIVE | Noted: 2024-10-18

## 2024-10-18 LAB
ALBUMIN SERPL-MCNC: 3.3 G/DL (ref 3.5–5.2)
ALBUMIN/GLOB SERPL: 1.3 G/DL
ALP SERPL-CCNC: 89 U/L (ref 39–117)
ALT SERPL W P-5'-P-CCNC: 17 U/L (ref 1–41)
ANION GAP SERPL CALCULATED.3IONS-SCNC: 8.2 MMOL/L (ref 5–15)
AST SERPL-CCNC: 14 U/L (ref 1–40)
BASOPHILS # BLD AUTO: 0.04 10*3/MM3 (ref 0–0.2)
BASOPHILS NFR BLD AUTO: 0.3 % (ref 0–1.5)
BILIRUB SERPL-MCNC: 0.3 MG/DL (ref 0–1.2)
BUN SERPL-MCNC: 29 MG/DL (ref 8–23)
BUN/CREAT SERPL: 23.8 (ref 7–25)
CALCIUM SPEC-SCNC: 8.7 MG/DL (ref 8.6–10.5)
CHLORIDE SERPL-SCNC: 105 MMOL/L (ref 98–107)
CO2 SERPL-SCNC: 23.8 MMOL/L (ref 22–29)
CREAT SERPL-MCNC: 1.22 MG/DL (ref 0.76–1.27)
D-LACTATE SERPL-SCNC: 2.1 MMOL/L (ref 0.5–2)
D-LACTATE SERPL-SCNC: 3.2 MMOL/L (ref 0.5–2)
DEPRECATED RDW RBC AUTO: 44.9 FL (ref 37–54)
EGFRCR SERPLBLD CKD-EPI 2021: 61.8 ML/MIN/1.73
EOSINOPHIL # BLD AUTO: 0.21 10*3/MM3 (ref 0–0.4)
EOSINOPHIL NFR BLD AUTO: 1.6 % (ref 0.3–6.2)
ERYTHROCYTE [DISTWIDTH] IN BLOOD BY AUTOMATED COUNT: 13.4 % (ref 12.3–15.4)
FERRITIN SERPL-MCNC: 135 NG/ML (ref 30–400)
GLOBULIN UR ELPH-MCNC: 2.6 GM/DL
GLUCOSE BLDC GLUCOMTR-MCNC: 102 MG/DL (ref 70–130)
GLUCOSE BLDC GLUCOMTR-MCNC: 107 MG/DL (ref 70–130)
GLUCOSE BLDC GLUCOMTR-MCNC: 110 MG/DL (ref 70–130)
GLUCOSE BLDC GLUCOMTR-MCNC: 135 MG/DL (ref 70–130)
GLUCOSE BLDC GLUCOMTR-MCNC: 90 MG/DL (ref 70–130)
GLUCOSE BLDC GLUCOMTR-MCNC: 90 MG/DL (ref 70–130)
GLUCOSE SERPL-MCNC: 94 MG/DL (ref 65–99)
HCT VFR BLD AUTO: 37.5 % (ref 37.5–51)
HGB BLD-MCNC: 12.5 G/DL (ref 13–17.7)
HGB BLD-MCNC: 12.9 G/DL (ref 13–17.7)
HGB BLD-MCNC: 13.6 G/DL (ref 13–17.7)
HOLD SPECIMEN: NORMAL
IMM GRANULOCYTES # BLD AUTO: 0.07 10*3/MM3 (ref 0–0.05)
IMM GRANULOCYTES NFR BLD AUTO: 0.5 % (ref 0–0.5)
IRON 24H UR-MRATE: 35 MCG/DL (ref 59–158)
IRON SATN MFR SERPL: 11 % (ref 20–50)
LYMPHOCYTES # BLD AUTO: 2.44 10*3/MM3 (ref 0.7–3.1)
LYMPHOCYTES NFR BLD AUTO: 18.8 % (ref 19.6–45.3)
MCH RBC QN AUTO: 31.2 PG (ref 26.6–33)
MCHC RBC AUTO-ENTMCNC: 33.3 G/DL (ref 31.5–35.7)
MCV RBC AUTO: 93.5 FL (ref 79–97)
MONOCYTES # BLD AUTO: 1.25 10*3/MM3 (ref 0.1–0.9)
MONOCYTES NFR BLD AUTO: 9.6 % (ref 5–12)
NEUTROPHILS NFR BLD AUTO: 69.2 % (ref 42.7–76)
NEUTROPHILS NFR BLD AUTO: 8.98 10*3/MM3 (ref 1.7–7)
NRBC BLD AUTO-RTO: 0 /100 WBC (ref 0–0.2)
PLATELET # BLD AUTO: 261 10*3/MM3 (ref 140–450)
PMV BLD AUTO: 11.3 FL (ref 6–12)
POTASSIUM SERPL-SCNC: 4.5 MMOL/L (ref 3.5–5.2)
PROT SERPL-MCNC: 5.9 G/DL (ref 6–8.5)
QT INTERVAL: 388 MS
QTC INTERVAL: 437 MS
RBC # BLD AUTO: 4.01 10*6/MM3 (ref 4.14–5.8)
SODIUM SERPL-SCNC: 137 MMOL/L (ref 136–145)
TIBC SERPL-MCNC: 319 MCG/DL (ref 298–536)
TRANSFERRIN SERPL-MCNC: 214 MG/DL (ref 200–360)
URATE SERPL-MCNC: 4.1 MG/DL (ref 3.4–7)
WBC NRBC COR # BLD AUTO: 12.99 10*3/MM3 (ref 3.4–10.8)

## 2024-10-18 PROCEDURE — 71046 X-RAY EXAM CHEST 2 VIEWS: CPT

## 2024-10-18 PROCEDURE — 85018 HEMOGLOBIN: CPT | Performed by: NURSE PRACTITIONER

## 2024-10-18 PROCEDURE — 25810000003 SODIUM CHLORIDE 0.9 % SOLUTION: Performed by: INTERNAL MEDICINE

## 2024-10-18 PROCEDURE — 25010000002 MORPHINE PER 10 MG: Performed by: NURSE PRACTITIONER

## 2024-10-18 PROCEDURE — 99232 SBSQ HOSP IP/OBS MODERATE 35: CPT | Performed by: STUDENT IN AN ORGANIZED HEALTH CARE EDUCATION/TRAINING PROGRAM

## 2024-10-18 PROCEDURE — 82728 ASSAY OF FERRITIN: CPT | Performed by: INTERNAL MEDICINE

## 2024-10-18 PROCEDURE — 84466 ASSAY OF TRANSFERRIN: CPT | Performed by: INTERNAL MEDICINE

## 2024-10-18 PROCEDURE — 83540 ASSAY OF IRON: CPT | Performed by: INTERNAL MEDICINE

## 2024-10-18 PROCEDURE — 0DB78ZX EXCISION OF STOMACH, PYLORUS, VIA NATURAL OR ARTIFICIAL OPENING ENDOSCOPIC, DIAGNOSTIC: ICD-10-PCS | Performed by: INTERNAL MEDICINE

## 2024-10-18 PROCEDURE — 84550 ASSAY OF BLOOD/URIC ACID: CPT | Performed by: INTERNAL MEDICINE

## 2024-10-18 PROCEDURE — 25010000002 PROPOFOL 1000 MG/100ML EMULSION: Performed by: NURSE ANESTHETIST, CERTIFIED REGISTERED

## 2024-10-18 PROCEDURE — 99222 1ST HOSP IP/OBS MODERATE 55: CPT | Performed by: NURSE PRACTITIONER

## 2024-10-18 PROCEDURE — 25010000002 PIPERACILLIN SOD-TAZOBACTAM PER 1 G: Performed by: STUDENT IN AN ORGANIZED HEALTH CARE EDUCATION/TRAINING PROGRAM

## 2024-10-18 PROCEDURE — 83605 ASSAY OF LACTIC ACID: CPT | Performed by: EMERGENCY MEDICINE

## 2024-10-18 PROCEDURE — 36415 COLL VENOUS BLD VENIPUNCTURE: CPT | Performed by: NURSE PRACTITIONER

## 2024-10-18 PROCEDURE — 82948 REAGENT STRIP/BLOOD GLUCOSE: CPT

## 2024-10-18 PROCEDURE — 43239 EGD BIOPSY SINGLE/MULTIPLE: CPT | Performed by: INTERNAL MEDICINE

## 2024-10-18 PROCEDURE — 25010000002 PROPOFOL 200 MG/20ML EMULSION: Performed by: NURSE ANESTHETIST, CERTIFIED REGISTERED

## 2024-10-18 PROCEDURE — 25010000002 LIDOCAINE 2% SOLUTION: Performed by: NURSE ANESTHETIST, CERTIFIED REGISTERED

## 2024-10-18 PROCEDURE — 88305 TISSUE EXAM BY PATHOLOGIST: CPT | Performed by: INTERNAL MEDICINE

## 2024-10-18 RX ORDER — PROPOFOL 10 MG/ML
INJECTION, EMULSION INTRAVENOUS CONTINUOUS PRN
Status: DISCONTINUED | OUTPATIENT
Start: 2024-10-18 | End: 2024-10-18 | Stop reason: SURG

## 2024-10-18 RX ORDER — SODIUM CHLORIDE 9 MG/ML
30 INJECTION, SOLUTION INTRAVENOUS CONTINUOUS
Status: ACTIVE | OUTPATIENT
Start: 2024-10-18 | End: 2024-10-18

## 2024-10-18 RX ORDER — COLCHICINE 0.6 MG/1
0.6 TABLET ORAL ONCE
Status: COMPLETED | OUTPATIENT
Start: 2024-10-18 | End: 2024-10-18

## 2024-10-18 RX ORDER — ISOSORBIDE MONONITRATE 30 MG/1
15 TABLET, EXTENDED RELEASE ORAL EVERY MORNING
Status: DISCONTINUED | OUTPATIENT
Start: 2024-10-19 | End: 2024-10-19

## 2024-10-18 RX ORDER — LIDOCAINE HYDROCHLORIDE 20 MG/ML
INJECTION, SOLUTION INFILTRATION; PERINEURAL AS NEEDED
Status: DISCONTINUED | OUTPATIENT
Start: 2024-10-18 | End: 2024-10-18 | Stop reason: SURG

## 2024-10-18 RX ORDER — PROPOFOL 10 MG/ML
INJECTION, EMULSION INTRAVENOUS AS NEEDED
Status: DISCONTINUED | OUTPATIENT
Start: 2024-10-18 | End: 2024-10-18 | Stop reason: SURG

## 2024-10-18 RX ORDER — PANTOPRAZOLE SODIUM 40 MG/1
40 TABLET, DELAYED RELEASE ORAL
Status: DISCONTINUED | OUTPATIENT
Start: 2024-10-18 | End: 2024-10-20

## 2024-10-18 RX ORDER — ATORVASTATIN CALCIUM 20 MG/1
40 TABLET, FILM COATED ORAL NIGHTLY
Status: DISCONTINUED | OUTPATIENT
Start: 2024-10-18 | End: 2024-10-23 | Stop reason: HOSPADM

## 2024-10-18 RX ORDER — COLCHICINE 0.6 MG/1
0.6 TABLET ORAL DAILY
Status: DISCONTINUED | OUTPATIENT
Start: 2024-10-18 | End: 2024-10-18

## 2024-10-18 RX ORDER — MORPHINE SULFATE 2 MG/ML
2 INJECTION, SOLUTION INTRAMUSCULAR; INTRAVENOUS EVERY 4 HOURS PRN
Status: DISPENSED | OUTPATIENT
Start: 2024-10-18 | End: 2024-10-21

## 2024-10-18 RX ORDER — LABETALOL HYDROCHLORIDE 5 MG/ML
10 INJECTION, SOLUTION INTRAVENOUS
Status: DISCONTINUED | OUTPATIENT
Start: 2024-10-18 | End: 2024-10-23 | Stop reason: HOSPADM

## 2024-10-18 RX ADMIN — Medication 2.5 MG: at 01:22

## 2024-10-18 RX ADMIN — MORPHINE SULFATE 2 MG: 2 INJECTION, SOLUTION INTRAMUSCULAR; INTRAVENOUS at 01:22

## 2024-10-18 RX ADMIN — PROPOFOL INJECTABLE EMULSION 80 MG: 10 INJECTION, EMULSION INTRAVENOUS at 10:33

## 2024-10-18 RX ADMIN — PANTOPRAZOLE SODIUM 8 MG/HR: 40 INJECTION, POWDER, FOR SOLUTION INTRAVENOUS at 12:54

## 2024-10-18 RX ADMIN — SODIUM CHLORIDE 250 ML: 9 INJECTION, SOLUTION INTRAVENOUS at 12:54

## 2024-10-18 RX ADMIN — Medication 2.5 MG: at 20:26

## 2024-10-18 RX ADMIN — PIPERACILLIN AND TAZOBACTAM 3.38 G: 3; .375 INJECTION, POWDER, FOR SOLUTION INTRAVENOUS at 16:50

## 2024-10-18 RX ADMIN — LIDOCAINE HYDROCHLORIDE 3 ML: 20 INJECTION, SOLUTION INFILTRATION; PERINEURAL at 10:33

## 2024-10-18 RX ADMIN — PANTOPRAZOLE SODIUM 40 MG: 40 TABLET, DELAYED RELEASE ORAL at 16:50

## 2024-10-18 RX ADMIN — PIPERACILLIN AND TAZOBACTAM 3.38 G: 3; .375 INJECTION, POWDER, FOR SOLUTION INTRAVENOUS at 08:04

## 2024-10-18 RX ADMIN — ACETAMINOPHEN 325MG 650 MG: 325 TABLET ORAL at 12:01

## 2024-10-18 RX ADMIN — PROPOFOL 140 MCG/KG/MIN: 10 INJECTION, EMULSION INTRAVENOUS at 10:33

## 2024-10-18 RX ADMIN — ATORVASTATIN CALCIUM 40 MG: 20 TABLET, FILM COATED ORAL at 20:25

## 2024-10-18 RX ADMIN — COLCHICINE 0.6 MG: 0.6 TABLET, FILM COATED ORAL at 11:54

## 2024-10-18 RX ADMIN — MORPHINE SULFATE 2 MG: 2 INJECTION, SOLUTION INTRAMUSCULAR; INTRAVENOUS at 08:04

## 2024-10-18 RX ADMIN — SODIUM CHLORIDE 30 ML/HR: 9 INJECTION, SOLUTION INTRAVENOUS at 10:24

## 2024-10-18 RX ADMIN — SODIUM CHLORIDE 75 ML/HR: 9 INJECTION, SOLUTION INTRAVENOUS at 12:54

## 2024-10-18 NOTE — PROGRESS NOTES
[facility] Medicine Services  PROGRESS NOTE    Patient Name: Kevon Orantes  : 1950  MRN: [id number]    Date of Admission: 10/17/2024  Primary Care Physician: Ronni Hernandez MD    Subjective   Subjective     CC:  Follow-up hematemesis    Subjective: No further vomiting or bleeding reported this morning.  Patient feels generally poor and weak.  His abdominal pain is better.  He agrees with plan for EGD today.  No other new complaints.  Patient complains of gout pain in his right knee and is asking for colchicine which she says fixes the pain.    Review of Systems  No current fevers or chills  No current shortness of breath or cough  No current chest pain or palpitations       Objective   Objective     Vital Signs:   Temp:  [96 °F (35.6 °C)-98.2 °F (36.8 °C)] 97.7 °F (36.5 °C)  Heart Rate:  [] 91  Resp:  [16-26] 20  BP: ()/(44-72) 114/64        Physical Exam:  Constitutional:Awake, alert, elderly appearing  HENT: NCAT, mucous membranes moist, neck supple  Respiratory: No cough or wheezes, normal respirations, nonlabored breathing   Cardiovascular: Pulse rate is normal, palpable radial pulses  Gastrointestinal:  soft, some tenderness, nondistended  Musculoskeletal: Right knee is tender to palpation, normal musculature for age, no lower extremity edema, BMI 29  Psychiatric: Appropriate affect, cooperative, conversational  Neurologic: No slurred speech or facial droop, follows commands  Skin: No rashes or jaundice, warm      Results Reviewed:  Results from last 7 days   Lab Units 10/18/24  0628 10/17/24  2357 10/17/24  1942 10/17/24  1413 10/17/24  1059   WBC 10*3/mm3  --  12.99*  --   --  17.53*   HEMOGLOBIN g/dL 12.9* 12.5* 12.9*   < > 13.8   HEMATOCRIT %  --  37.5  --   --  44.1   PLATELETS 10*3/mm3  --  261  --   --  339   INR   --   --   --   --  1.08    < > = values in this interval not displayed.     Results from last 7 days   Lab Units 10/17/24  2357 10/17/24  1059   SODIUM mmol/L 137  137   POTASSIUM mmol/L 4.5 4.2   CHLORIDE mmol/L 105 100   CO2 mmol/L 23.8 26.5   BUN mg/dL 29* 34*   CREATININE mg/dL 1.22 1.59*   GLUCOSE mg/dL 94 170*   CALCIUM mg/dL 8.7 9.7   ALK PHOS U/L 89 97   ALT (SGPT) U/L 17 20   AST (SGOT) U/L 14 17   HSTROP T ng/L  --  22*   PROBNP pg/mL  --  265.0     Estimated Creatinine Clearance: 63.8 mL/min (by C-G formula based on SCr of 1.22 mg/dL).    Microbiology Results Abnormal       None            Imaging Results (Last 24 Hours)       Procedure Component Value Units Date/Time    XR Chest PA & Lateral [[id number]] Collected: 10/18/24 0659     Updated: 10/18/24 0706    Narrative:      XR CHEST PA AND LATERAL-     INDICATION: Sepsis     COMPARISON: Chest radiographs dating back to 10/29/2019. CT abdomen  angiogram 10/17/2024       Impression:      Left lower lobe likely linear atelectasis/scarring. No other  focal consolidation. No pleural effusion or pneumothorax. Unchanged  right hemidiaphragm elevation. Normal size cardiomediastinal silhouette.  No focal osseous abnormality.     This report was finalized on 10/18/2024 7:02 AM by Dr. Bismark Joel M.D on Workstation: [id number]       CT Angiogram Abdomen Pelvis [[id number]] Collected: 10/18/24 0115     Updated: 10/18/24 0132    Narrative:      CT ANGIOGRAM OF THE ABDOMEN AND PELVIS     HISTORY: Hematemesis     COMPARISON: October 17, 2024     TECHNIQUE: Axial CT imaging was obtained through the abdomen and pelvis.  IV contrast was administered. Three-D reformatted images were obtained.     FINDINGS:  Images through the lung bases demonstrate background emphysematous  changes. There is scarring identified at both lung bases. Elevation of  the right hemidiaphragm is noted.     There is atherosclerotic involvement in the abdominal aorta. Calcified  plaque is noted at the origin of the celiac axis, with no  hemodynamically significant stenosis. The patient does have a severe  stenosis involving the proximal superior  mesenteric artery. There is  calcification at the origin of the inferior mesenteric artery. There may  be a moderate stenosis involving the origin of the inferior mesenteric  artery. There is a single right renal artery, and 2 left renal arteries.  These appear to be widely patent. Calcified plaque continues into the  common iliac vessels bilaterally. There is mild narrowing involving the  proximal left common iliac artery. There is moderate to severe stenosis  of the origin of the left internal iliac artery, and mild stenosis of  the origin of the right internal iliac artery. There is an intimal flap  noted within the distal left external iliac artery, as well as eccentric  plaque, resulting in moderate stenosis. There is additional eccentric  calcified plaque within the right external iliac artery, resulting in  mild narrowing. Additional eccentric calcified plaque is noted within  the common femoral arteries bilaterally. The visualized profunda femoris  and superficial femoral arteries are patent.     No suspicious hepatic lesions are seen. Stomach, duodenum, adrenal  glands, and pancreas appear normal on the current study. Gallbladder  contains some hyperdense material, likely related to vicarious excretion  of contrast. Calcified granulomata are seen within the spleen. Kidneys  enhance symmetrically. There is a simple appearing right renal cyst.  Excreted contrast material is noted within the urinary bladder. Prostate  gland contains dystrophic calcifications. There is colonic  diverticulosis. There is no bowel obstruction there is no bowel wall  thickening or edema to suggest ischemia. No pneumatosis or free air is  seen.. The appendix is normal. No acute osseous abnormalities are seen.       Impression:      No evidence of bowel ischemia. The patient does have a severe stenosis  involving the proximal superior mesenteric artery. However, the celiac  axis appears widely patent, as is the inferior mesenteric  artery,  although there is a moderate narrowing of the proximal inferior  mesenteric artery.        Radiation dose reduction techniques were utilized, including automated  exposure control and exposure modulation based on body size.        This report was finalized on 10/18/2024 1:29 AM by Dr. Debra Duenas M.D on Workstation: [id number]       CT Abdomen Pelvis With Contrast [[id number]] Collected: 10/17/24 1251     Updated: 10/17/24 1308    Narrative:      CT ABDOMEN PELVIS W CONTRAST-     DATE OF EXAM: 10/17/2024 12:05 PM     INDICATION: Nausea and vomiting with epigastric pain. Hematemesis.     COMPARISON: Chest radiographs 10/17/2024 and 10/29/2019.     TECHNIQUE: Multiple contiguous axial images were acquired through the  abdomen and pelvis following the intravenous administration of 85 mL of  Isovue-370. Reformatted coronal and sagittal sequences were also  reviewed. Radiation dose reduction techniques were utilized, including  automated exposure control and exposure modulation based on body size.     FINDINGS:  Chronic elevation of the right hemidiaphragm with multifocal right  perihilar and bibasilar subsegmental atelectasis and/or scarring. Likely  benign sub-6 mm pulmonary nodule in the lingula (axial series 2 image  23). Partially imaged mild to moderate chronic emphysematous changes in  both lungs. Severe calcified coronary artery disease.     The liver, gallbladder, spleen, pancreas, and adrenal glands are  unremarkable. Simple appearing bilateral renal cysts. Scarring at the  interpolar left kidney. The urinary bladder is nondistended. Diffuse  urinary bladder wall thickening could be accentuated by under  distention. Dystrophic appearing calcifications in the prostate gland.     Mild diffuse gastric wall thickening could be accentuated by  underdistention but could reflect a nonspecific gastritis. Mild to  moderate colorectal stool. Colonic diverticula, without CT evidence of  diverticulitis. No  bowel obstruction or significant bowel wall  thickening. The appendix is normal.     No free fluid in the abdomen or pelvis. No free intraperitoneal air.  Moderate calcified atherosclerotic disease in the abdominal aorta and  its distal branches without aneurysm. Calcified and noncalcified  atherosclerotic disease in the proximal superior mesenteric artery,  resulting in high-grade stenosis.     Small fat-containing bilateral inguinal hernias. Mild to moderate  multilevel lumbar spondylosis. Flowing multilevel lower thoracic  anterior osteophyte formation. Mild to moderate bilateral hip and SI  joint DJD with left SI joint bridging osteophyte formation. Mild to  moderate DJD at the pubic symphysis. No acute osseous abnormality or  concerning osseous lesion.       Impression:         1. Mild diffuse gastric wall thickening could be accentuated by  underdistention but could reflect a nonspecific gastritis. Consider  correlating with endoscopy if clinically indicated.  2. Calcified and noncalcified atherosclerotic disease in the proximal  superior mesenteric artery, resulting in high-grade stenosis.  3. Colonic diverticula, without CT evidence of diverticulitis.     This report was finalized on 10/17/2024 1:04 PM by Thad Pierre MD on  Workstation: [id number]       XR Chest 1 View [[id number]] Collected: 10/17/24 1110     Updated: 10/17/24 1115    Narrative:      XR CHEST 1 VW-     HISTORY: 74-year-old male with shortness of breath. Vomiting bright red  blood.     FINDINGS: There is elevation of the right hemidiaphragm, unchanged since  previous radiograph 10/29/2019. There is no evidence for effusions or  CHF. Given the elevation of the right hemidiaphragm, follow-up with 2  views of the chest is recommended.     This report was finalized on 10/17/2024 11:12 AM by Dr. Susie Contreras M.D  on Workstation: [id number]               Results for orders placed during the hospital encounter of 10/29/19    Adult transthoracic  echo complete    Interpretation Summary  · Estimated EF = 56%.  · Left ventricular systolic function is normal.      I have reviewed the medications:  Scheduled Meds:colchicine, 0.6 mg, Oral, Once  insulin lispro, 2-7 Units, Subcutaneous, 4x Daily AC & at Bedtime  [START ON 10/19/2024] isosorbide mononitrate, 15 mg, Oral, QAM  [Held by provider] lisinopril, 20 mg, Oral, Daily  piperacillin-tazobactam, 3.375 g, Intravenous, Q8H      Continuous Infusions:pantoprazole, 8 mg/hr, Last Rate: 8 mg/hr (10/17/24 6791)  sodium chloride, 75 mL/hr, Last Rate: 75 mL/hr (10/17/24 2303)      PRN Meds:.  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        Brief Hospital Course to date:  Kevon Orantes is a 74 y.o. male presents to the hospital with hematemesis, abdominal pain, and general weakness and CT scan shows diffuse gastric wall thickening and high-grade stenosis of the superior mesenteric artery.    Discussion/plan for today: All medical problems are monitored my management today.  Patient has SIRS concerning for sepsis from abdominal source.  Possible bacterial enteritis.  Leukocytosis has improved with antibiotics.  IV fluid for lactic acidosis.  Vascular surgery following for mesenteric  artery stenosis however this is not felt to be the primary issue.  Abdominal pain much improved on PPI and with fluids.  Lactic acidosis is normalizing.  Monitor glucose and adjust insulin as needed.  Hold blood thinners for hematemesis.  Plan for trial of colchicine for right knee pain concerning for gout flare.  Check uric acid.  Broad-spectrum antibiotic coverage.  Monitor blood pressure and vital signs carefully.  EGD later today and will follow-up on results and discussed with GI.  Treatment plan discussed with the patient is in agreement.    SIRS concern for infection/possible sepsis from abdominal source with lactic acidosis:  Zosyn and broad-spectrum coverage.  IV fluid    Hematemesis:  Trend anemia.  PPI.  Gastroenterology following    Prerenal acute kidney injury:  Improved with IV fluid initially.  Trend renal function and renally dose medications.    SMA stenosis:  Evaluated by vascular surgery who feels patient does not have mesenteric ischemia.    Gout: Chronic issue.  Feels he is having right knee flare.    Diabetes: Monitor glucose and adjust insulin as needed    Essential hypertension: Monitor blood pressure and adjust medicines as needed.  Labetalol as needed for greater than 180.    Hyperlipidemia: Continue statin.  Stable.    DVT Prophylaxis: Mechanical      Disposition: Probably home pending clinical course    CODE STATUS:   Code Status and Medical Interventions: CPR (Attempt to Resuscitate); Full   Ordered at: 10/17/24 2052     Code Status (Patient has no pulse and is not breathing):    CPR (Attempt to Resuscitate)     Medical Interventions (Patient has pulse or is breathing):    Full       Dave Block MD  10/18/24
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
## 2024-10-18 NOTE — PLAN OF CARE
Goal Outcome Evaluation:              Outcome Evaluation: Patient is alert and oriented times 4, on room air and independent baseline. He was admitted due to vomiting blood, sob and generalized weakness. Repeat lactic acid is 2.4, LR 1l bolus done. Blood culture times 2 done, result si still in process. IV antibiotic amd IV normal saline  started. CTA abdomen and pelvis done showed SMA disease. Surgery consulted and following. Vascular surgeon consulted. Pain medicine  as needed given for his lower extremity pain.

## 2024-10-18 NOTE — PLAN OF CARE
Goal Outcome Evaluation:  Plan of Care Reviewed With: patient, spouse        Progress: improving  Outcome Evaluation: Pt admitted from obs unit after EGD this afternoon, 250 bolus given per order, c/o pain in R leg d/t gout- medication given with much improvement, up to chair and feeling much better this evening, IV abx, IVFs, resting comfortably wife at bedside

## 2024-10-18 NOTE — ANESTHESIA POSTPROCEDURE EVALUATION
Patient: Kevon Orantes    Procedure Summary       Date: 10/18/24 Room / [facility]:  [facility]    Anesthesia Start: 1029 Anesthesia Stop: 1047    Procedure: ESOPHAGOGASTRODUODENOSCOPY witih biopsies (Esophagus) Diagnosis:       Hematemesis with nausea      (Hematemesis with nausea [K92.0])    Surgeons: Matthias Schulte MD Provider: Marichuy Bill MD    Anesthesia Type: MAC ASA Status: 3            Anesthesia Type: MAC    Vitals  Vitals Value Taken Time   /60 10/18/24 1102   Temp     Pulse 100 10/18/24 1102   Resp 16 10/18/24 1102   SpO2 97 % 10/18/24 1102           Post Anesthesia Care and Evaluation    Patient location during evaluation: bedside  Patient participation: complete - patient participated  Level of consciousness: awake and alert  Pain management: adequate    Airway patency: patent  Anesthetic complications: No anesthetic complications  PONV Status: controlled  Cardiovascular status: acceptable  Respiratory status: acceptable  Hydration status: acceptable

## 2024-10-18 NOTE — BRIEF OP NOTE
ESOPHAGOGASTRODUODENOSCOPY  Progress Note    Kevon Orantes  10/18/2024    Pre-op Diagnosis:   Hematemesis with nausea [K92.0]       Post-Op Diagnosis Codes:     * Hematemesis with nausea [K92.0]    Procedure/CPT® Codes:        Procedure(s):  ESOPHAGOGASTRODUODENOSCOPY witih biopsies              Surgeon(s):  Matthias Schulte MD    Anesthesia: Monitored Anesthesia Care    Staff:   Endo Technician: Wale Bob  Endo Nurse: Lauren Burger RN         Estimated Blood Loss: none    Urine Voided: * No values recorded between 10/18/2024 10:29 AM and 10/18/2024 10:43 AM *    Specimens:                Specimens       ID Source Type Tests Collected By Collected At Frozen?    A Gastric, Antrum Tissue TISSUE PATHOLOGY EXAM   Matthias Schulte MD 10/18/24 1036 No    Description: gastric antrum biopsies    This specimen was not marked as sent.                  Drains: * No LDAs found *    Findings:     Mild diffuse prepyloric antral gastritis.  Biopsies obtained from the antrum for H. pylori testing  Patient had a mild stricture in the immediate postbulbar duodenum which was dilated with a gentle push of the scope.  Examination was otherwise normal up to third part of duodenum.        Complications: None    Recommendations:    Regular diet  Reassurance is in order  Patient can be discharged from gastrointestinal standpoint.      GI service will sign off.  Please call if needed.          Matthias Schulte MD     Date: 10/18/2024  Time: 10:48 EDT
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
## 2024-10-18 NOTE — PROGRESS NOTES
[facility]   Vascular Surgery Progress Note    Patient Name: Kevon Orantes  : 1950  MRN: [id number]  Date of admission: 10/17/2024  Surgical Procedures Since Admission:  Procedure(s):  ESOPHAGOGASTRODUODENOSCOPY witih biopsies  Surgeon:  Matthias Schulte MD  Status:  Day of Surgery  -------------------    Subjective   Subjective     Chief Complaint: follow up SMA stenosis    History of Present Illness   S/p EGD this morning.  Abdominal pain resolved, sitting up in chair.  No further hematemesis.  Complaining of lower extremity joint pain due to gout. He denies any post prandial pain or unintentional weight loss prior to admission.     Review of Systems   All other systems reviewed and are negative.      Objective   Objective     Vitals:   Temp:  [97.5 °F (36.4 °C)-98.2 °F (36.8 °C)] 97.7 °F (36.5 °C)  Heart Rate:  [] 94  Resp:  [16-22] 16  BP: ()/(56-76) 133/58  Flow (L/min) (Oxygen Therapy):  [3] 3    Physical Exam      Result Review    Result Review:  I have personally reviewed the results from the time of this admission to 10/18/2024 16:45 EDT and agree with these findings:  [x]  Laboratory list / accordion  []  Microbiology  []  Radiology  []  EKG/Telemetry   []  Cardiology/Vascular   []  Pathology  []  Old records  [x]  Other: EGD report        Assessment & Plan   Assessment / Plan     Brief Patient Summary:  Kevon Orantes is a 74 y.o. male who presented with hematemesis and was incidentally found to have severe SMA stenosis, celiac and ALIDA are widely patent.  No clinical evidence of acute bowel ischemia.      Active Hospital Problems:   Active Hospital Problems    Diagnosis     **Hematemesis     Abdominal pain     Generalized weakness     Superior mesenteric artery stenosis     ELVIN (acute kidney injury)     SIRS (systemic inflammatory response syndrome)     Lactic acidosis     Hypertension     Diabetes mellitus     Hyperlipidemia     Peripheral vascular disease     Carotid artery stenosis       Plan:   -will arrange outpatient follow up for him for his SMA stenosis.  He does not currently have any symptoms.  Recommend medical management with aspirin and statin long term.    -vascular will sign off.  Please call with questions.    VTE Prophylaxis:  Mechanical VTE prophylaxis orders are present.        Sue Edmond MD

## 2024-10-18 NOTE — ANESTHESIA PREPROCEDURE EVALUATION
" Anesthesia Evaluation     Patient summary reviewed and Nursing notes reviewed   NPO Solid Status: > 8 hours  NPO Liquid Status: > 2 hours           Airway   Mallampati: III  TM distance: >3 FB  Neck ROM: full  Possible difficult intubation, Anterior and Small opening  Dental      Pulmonary    Cardiovascular     ECG reviewed    (+) hypertension, CAD, dysrhythmias PAC, PVD, hyperlipidemia,  carotid artery disease      Neuro/Psych  (+) CVA  GI/Hepatic/Renal/Endo    (+) GI bleeding , renal disease- ARF, diabetes mellitus    Musculoskeletal     (+) neck pain  Abdominal    Substance History      OB/GYN          Other   arthritis,                       Anesthesia Plan    ASA 3     MAC     (I have reviewed the patient's history and chart with the patient, including all pertinent laboratory results and imaging. I have explained the risks of anesthesia including but not limited to dental damage, corneal abrasion, nerve injury, MI, stroke, aspiration, and death. Patient has agreed to proceed.      /76 (BP Location: Right arm, Patient Position: Sitting)   Pulse 93   Temp 36.5 °C (97.7 °F) (Oral)   Resp 22   Ht 182.9 cm (72\")   Wt 99.2 kg (218 lb 11.2 oz)   SpO2 98%   BMI 29.66 kg/m²   )  intravenous induction     Anesthetic plan, risks, benefits, and alternatives have been provided, discussed and informed consent has been obtained with: patient.        CODE STATUS:    Code Status (Patient has no pulse and is not breathing): CPR (Attempt to Resuscitate)  Medical Interventions (Patient has pulse or is breathing): Full      "
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
## 2024-10-19 ENCOUNTER — APPOINTMENT (OUTPATIENT)
Dept: GENERAL RADIOLOGY | Facility: HOSPITAL | Age: 74
End: 2024-10-19
Payer: MEDICARE

## 2024-10-19 ENCOUNTER — APPOINTMENT (OUTPATIENT)
Dept: CARDIOLOGY | Facility: HOSPITAL | Age: 74
End: 2024-10-19
Payer: MEDICARE

## 2024-10-19 LAB
ALBUMIN SERPL-MCNC: 3.4 G/DL (ref 3.5–5.2)
ALBUMIN/GLOB SERPL: 1.1 G/DL
ALP SERPL-CCNC: 95 U/L (ref 39–117)
ALT SERPL W P-5'-P-CCNC: 16 U/L (ref 1–41)
ANION GAP SERPL CALCULATED.3IONS-SCNC: 11 MMOL/L (ref 5–15)
AST SERPL-CCNC: 14 U/L (ref 1–40)
BH CV LOW VAS RIGHT DISTAL FEMORAL SPONT: 1
BH CV LOWER VASCULAR LEFT COMMON FEMORAL AUGMENT: NORMAL
BH CV LOWER VASCULAR LEFT COMMON FEMORAL COMPETENT: NORMAL
BH CV LOWER VASCULAR LEFT COMMON FEMORAL COMPRESS: NORMAL
BH CV LOWER VASCULAR LEFT COMMON FEMORAL PHASIC: NORMAL
BH CV LOWER VASCULAR LEFT COMMON FEMORAL SPONT: NORMAL
BH CV LOWER VASCULAR LEFT DISTAL FEMORAL COMPRESS: NORMAL
BH CV LOWER VASCULAR LEFT GASTRONEMIUS COMPRESS: NORMAL
BH CV LOWER VASCULAR LEFT GREATER SAPH AK COMPRESS: NORMAL
BH CV LOWER VASCULAR LEFT GREATER SAPH BK COMPRESS: NORMAL
BH CV LOWER VASCULAR LEFT LESSER SAPH COMPRESS: NORMAL
BH CV LOWER VASCULAR LEFT MID FEMORAL AUGMENT: NORMAL
BH CV LOWER VASCULAR LEFT MID FEMORAL COMPETENT: NORMAL
BH CV LOWER VASCULAR LEFT MID FEMORAL COMPRESS: NORMAL
BH CV LOWER VASCULAR LEFT MID FEMORAL PHASIC: NORMAL
BH CV LOWER VASCULAR LEFT MID FEMORAL SPONT: NORMAL
BH CV LOWER VASCULAR LEFT PERONEAL COMPRESS: NORMAL
BH CV LOWER VASCULAR LEFT POPLITEAL AUGMENT: NORMAL
BH CV LOWER VASCULAR LEFT POPLITEAL COMPETENT: NORMAL
BH CV LOWER VASCULAR LEFT POPLITEAL COMPRESS: NORMAL
BH CV LOWER VASCULAR LEFT POPLITEAL PHASIC: NORMAL
BH CV LOWER VASCULAR LEFT POPLITEAL SPONT: NORMAL
BH CV LOWER VASCULAR LEFT POSTERIOR TIBIAL COMPRESS: NORMAL
BH CV LOWER VASCULAR LEFT PROFUNDA FEMORAL COMPRESS: NORMAL
BH CV LOWER VASCULAR LEFT PROXIMAL FEMORAL COMPRESS: NORMAL
BH CV LOWER VASCULAR LEFT SAPHENOFEMORAL JUNCTION COMPRESS: NORMAL
BH CV LOWER VASCULAR RIGHT COMMON FEMORAL AUGMENT: NORMAL
BH CV LOWER VASCULAR RIGHT COMMON FEMORAL COMPETENT: NORMAL
BH CV LOWER VASCULAR RIGHT COMMON FEMORAL COMPRESS: NORMAL
BH CV LOWER VASCULAR RIGHT COMMON FEMORAL PHASIC: NORMAL
BH CV LOWER VASCULAR RIGHT COMMON FEMORAL SPONT: NORMAL
BH CV LOWER VASCULAR RIGHT DISTAL FEMORAL COMPRESS: NORMAL
BH CV LOWER VASCULAR RIGHT DISTAL FEMORAL THROMBUS: NORMAL
BH CV LOWER VASCULAR RIGHT GASTRONEMIUS COMPRESS: NORMAL
BH CV LOWER VASCULAR RIGHT GREATER SAPH AK COMPRESS: NORMAL
BH CV LOWER VASCULAR RIGHT GREATER SAPH BK COMPRESS: NORMAL
BH CV LOWER VASCULAR RIGHT LESSER SAPH COMPRESS: NORMAL
BH CV LOWER VASCULAR RIGHT MID FEMORAL AUGMENT: NORMAL
BH CV LOWER VASCULAR RIGHT MID FEMORAL COMPETENT: NORMAL
BH CV LOWER VASCULAR RIGHT MID FEMORAL COMPRESS: NORMAL
BH CV LOWER VASCULAR RIGHT MID FEMORAL PHASIC: NORMAL
BH CV LOWER VASCULAR RIGHT MID FEMORAL SPONT: NORMAL
BH CV LOWER VASCULAR RIGHT PERONEAL COMPRESS: NORMAL
BH CV LOWER VASCULAR RIGHT POPLITEAL AUGMENT: NORMAL
BH CV LOWER VASCULAR RIGHT POPLITEAL COMPETENT: NORMAL
BH CV LOWER VASCULAR RIGHT POPLITEAL COMPRESS: NORMAL
BH CV LOWER VASCULAR RIGHT POPLITEAL PHASIC: NORMAL
BH CV LOWER VASCULAR RIGHT POPLITEAL SPONT: NORMAL
BH CV LOWER VASCULAR RIGHT POSTERIOR TIBIAL COMPRESS: NORMAL
BH CV LOWER VASCULAR RIGHT PROFUNDA FEMORAL COMPRESS: NORMAL
BH CV LOWER VASCULAR RIGHT PROXIMAL FEMORAL COMPRESS: NORMAL
BH CV LOWER VASCULAR RIGHT SAPHENOFEMORAL JUNCTION COMPRESS: NORMAL
BH CV LOWER VASCULAR RIGHT SOLEAL COMPRESS: NORMAL
BILIRUB SERPL-MCNC: 0.6 MG/DL (ref 0–1.2)
BUN SERPL-MCNC: 15 MG/DL (ref 8–23)
BUN/CREAT SERPL: 13.2 (ref 7–25)
CALCIUM SPEC-SCNC: 9.2 MG/DL (ref 8.6–10.5)
CHLORIDE SERPL-SCNC: 101 MMOL/L (ref 98–107)
CO2 SERPL-SCNC: 22 MMOL/L (ref 22–29)
CREAT SERPL-MCNC: 1.14 MG/DL (ref 0.76–1.27)
DEPRECATED RDW RBC AUTO: 46.7 FL (ref 37–54)
EGFRCR SERPLBLD CKD-EPI 2021: 67.1 ML/MIN/1.73
ERYTHROCYTE [DISTWIDTH] IN BLOOD BY AUTOMATED COUNT: 13.8 % (ref 12.3–15.4)
GLOBULIN UR ELPH-MCNC: 3.2 GM/DL
GLUCOSE BLDC GLUCOMTR-MCNC: 106 MG/DL (ref 70–130)
GLUCOSE BLDC GLUCOMTR-MCNC: 137 MG/DL (ref 70–130)
GLUCOSE BLDC GLUCOMTR-MCNC: 147 MG/DL (ref 70–130)
GLUCOSE BLDC GLUCOMTR-MCNC: 219 MG/DL (ref 70–130)
GLUCOSE SERPL-MCNC: 126 MG/DL (ref 65–99)
HCT VFR BLD AUTO: 39.8 % (ref 37.5–51)
HGB BLD-MCNC: 13.4 G/DL (ref 13–17.7)
MCH RBC QN AUTO: 31.1 PG (ref 26.6–33)
MCHC RBC AUTO-ENTMCNC: 33.7 G/DL (ref 31.5–35.7)
MCV RBC AUTO: 92.3 FL (ref 79–97)
PLATELET # BLD AUTO: 259 10*3/MM3 (ref 140–450)
PMV BLD AUTO: 11.7 FL (ref 6–12)
POTASSIUM SERPL-SCNC: 3.9 MMOL/L (ref 3.5–5.2)
PROT SERPL-MCNC: 6.6 G/DL (ref 6–8.5)
RBC # BLD AUTO: 4.31 10*6/MM3 (ref 4.14–5.8)
SODIUM SERPL-SCNC: 134 MMOL/L (ref 136–145)
WBC NRBC COR # BLD AUTO: 17.46 10*3/MM3 (ref 3.4–10.8)

## 2024-10-19 PROCEDURE — 73610 X-RAY EXAM OF ANKLE: CPT

## 2024-10-19 PROCEDURE — 85027 COMPLETE CBC AUTOMATED: CPT | Performed by: INTERNAL MEDICINE

## 2024-10-19 PROCEDURE — 93970 EXTREMITY STUDY: CPT | Performed by: SURGERY

## 2024-10-19 PROCEDURE — 25010000002 LABETALOL 5 MG/ML SOLUTION: Performed by: INTERNAL MEDICINE

## 2024-10-19 PROCEDURE — 99233 SBSQ HOSP IP/OBS HIGH 50: CPT | Performed by: STUDENT IN AN ORGANIZED HEALTH CARE EDUCATION/TRAINING PROGRAM

## 2024-10-19 PROCEDURE — 93970 EXTREMITY STUDY: CPT

## 2024-10-19 PROCEDURE — 73562 X-RAY EXAM OF KNEE 3: CPT

## 2024-10-19 PROCEDURE — 80053 COMPREHEN METABOLIC PANEL: CPT | Performed by: INTERNAL MEDICINE

## 2024-10-19 PROCEDURE — 25010000002 PIPERACILLIN SOD-TAZOBACTAM PER 1 G: Performed by: STUDENT IN AN ORGANIZED HEALTH CARE EDUCATION/TRAINING PROGRAM

## 2024-10-19 PROCEDURE — 63710000001 INSULIN LISPRO (HUMAN) PER 5 UNITS: Performed by: INTERNAL MEDICINE

## 2024-10-19 PROCEDURE — 82948 REAGENT STRIP/BLOOD GLUCOSE: CPT

## 2024-10-19 PROCEDURE — 73560 X-RAY EXAM OF KNEE 1 OR 2: CPT

## 2024-10-19 PROCEDURE — 25810000003 SODIUM CHLORIDE 0.9 % SOLUTION: Performed by: INTERNAL MEDICINE

## 2024-10-19 RX ORDER — TRAMADOL HYDROCHLORIDE 50 MG/1
50 TABLET ORAL EVERY 6 HOURS PRN
Status: DISCONTINUED | OUTPATIENT
Start: 2024-10-19 | End: 2024-10-23 | Stop reason: HOSPADM

## 2024-10-19 RX ORDER — HYDROMORPHONE HYDROCHLORIDE 1 MG/ML
0.5 INJECTION, SOLUTION INTRAMUSCULAR; INTRAVENOUS; SUBCUTANEOUS
Status: DISCONTINUED | OUTPATIENT
Start: 2024-10-19 | End: 2024-10-23 | Stop reason: HOSPADM

## 2024-10-19 RX ORDER — TRAMADOL HYDROCHLORIDE 50 MG/1
50 TABLET ORAL ONCE
Status: COMPLETED | OUTPATIENT
Start: 2024-10-19 | End: 2024-10-19

## 2024-10-19 RX ORDER — ISOSORBIDE MONONITRATE 30 MG/1
30 TABLET, EXTENDED RELEASE ORAL EVERY MORNING
Status: DISCONTINUED | OUTPATIENT
Start: 2024-10-20 | End: 2024-10-23 | Stop reason: HOSPADM

## 2024-10-19 RX ORDER — COLCHICINE 0.6 MG/1
0.6 TABLET ORAL ONCE
Status: COMPLETED | OUTPATIENT
Start: 2024-10-19 | End: 2024-10-19

## 2024-10-19 RX ADMIN — PANTOPRAZOLE SODIUM 40 MG: 40 TABLET, DELAYED RELEASE ORAL at 18:16

## 2024-10-19 RX ADMIN — PIPERACILLIN AND TAZOBACTAM 3.38 G: 3; .375 INJECTION, POWDER, FOR SOLUTION INTRAVENOUS at 07:47

## 2024-10-19 RX ADMIN — TRAMADOL HYDROCHLORIDE 50 MG: 50 TABLET ORAL at 10:14

## 2024-10-19 RX ADMIN — COLCHICINE 0.6 MG: 0.6 TABLET ORAL at 18:39

## 2024-10-19 RX ADMIN — SODIUM CHLORIDE 75 ML/HR: 9 INJECTION, SOLUTION INTRAVENOUS at 05:51

## 2024-10-19 RX ADMIN — INSULIN LISPRO 3 UNITS: 100 INJECTION, SOLUTION INTRAVENOUS; SUBCUTANEOUS at 21:16

## 2024-10-19 RX ADMIN — TRAMADOL HYDROCHLORIDE 50 MG: 50 TABLET, FILM COATED ORAL at 19:53

## 2024-10-19 RX ADMIN — Medication 2.5 MG: at 19:53

## 2024-10-19 RX ADMIN — ATORVASTATIN CALCIUM 40 MG: 20 TABLET, FILM COATED ORAL at 19:53

## 2024-10-19 RX ADMIN — PIPERACILLIN AND TAZOBACTAM 3.38 G: 3; .375 INJECTION, POWDER, FOR SOLUTION INTRAVENOUS at 02:01

## 2024-10-19 RX ADMIN — LABETALOL HYDROCHLORIDE 10 MG: 5 INJECTION, SOLUTION INTRAVENOUS at 19:53

## 2024-10-19 RX ADMIN — PANTOPRAZOLE SODIUM 40 MG: 40 TABLET, DELAYED RELEASE ORAL at 07:47

## 2024-10-19 NOTE — PROGRESS NOTES
Cultures remain negative.  No clear infection identified.  Surgery has stopped antibiotics.  X ray shows effusion on the knee and ankle.  Chronic RLE DVT.  Ask ortho to see and aspirate knee if possible. Perhaps pseudo-gout or other issue.      Electronically signed by Dave Block MD, 10/19/24, 4:49 PM EDT.

## 2024-10-19 NOTE — PLAN OF CARE
Goal Outcome Evaluation:              Outcome Evaluation: Patient complains of pain in feet, painful to the touch, no PRN medication requested. Frequent position changes for comfort. HR ST during the night. Patient continues to have confusion, disoriented to place. Patient safety maintained. Plan of care ongoing.

## 2024-10-19 NOTE — PROGRESS NOTES
General Surgery Note    Summary:  74-year-old gentleman admitted after an episode of hematemesis, incidentally found to have SMA stenosis.  Upper endoscopy was essentially normal.  He has no abdominal pain and is tolerating a regular diet.  No clinically evident mesenteric ischemia.    Interval Status:  Having some knee and ankle pain tender to light touch consistent with his history of gout.  Tolerating diet.  No abdominal pain.  No nausea or vomiting.  No blood per rectum.    Physical Exam:    Not sick  Afebrile, sinus tachycardia.  Normotensive  Nonlabored breathing on room air  Abdomen soft, nontender, nondistended  Right lower extremity tender to touch at knee and ankle, no asymmetric leg swelling.  Both feet warm and well-perfused.    Labs:  WBC 17.46, hemoglobin 13.4, platelets 259  Creatinine 1.14, BUN 15    Plan:  Continue diet  Does not need antibiotics from my standpoint  No clinically evident mesenteric ischemia or source of upper GI bleed on EGD  General Surgery will sign off, please call with questions or concerns

## 2024-10-19 NOTE — PROGRESS NOTES
[facility] Medicine Services  PROGRESS NOTE    Patient Name: Kevon Orantes  : 1950  MRN: [id number]    Date of Admission: 10/17/2024  Primary Care Physician: Ronni Hernandez MD    Subjective   Subjective     CC:  Follow-up hematemesis    Subjective: Patient found to be tachycardic this morning.  He is having severe pain in his right knee and ankle.  It did not improve with colchicine like it typically does    Review of Systems  No current fevers or chills  No current shortness of breath or cough  No current chest pain or palpitations       Objective   Objective     Vital Signs:   Temp:  [97.5 °F (36.4 °C)-98.8 °F (37.1 °C)] 97.5 °F (36.4 °C)  Heart Rate:  [] 113  Resp:  [16-22] 16  BP: ()/(56-88) 137/80        Physical Exam:  Constitutional:Awake, alert, elderly appearing  HENT: NCAT, mucous membranes moist, neck supple  Respiratory: No cough or wheezes, normal respirations, nonlabored breathing   Cardiovascular: Pulse rate is normal, palpable radial pulses  Gastrointestinal:  soft, minimal abdominal tenderness, nondistended  Musculoskeletal: Right knee is tender to palpation with swelling as is the right ankle, normal musculature for age, no lower extremity edema, BMI 29  Psychiatric: Appropriate affect, cooperative, conversational  Neurologic: No slurred speech or facial droop, follows commands  Skin: No rashes or jaundice, warm      Results Reviewed:  Results from last 7 days   Lab Units 10/19/24  0324 10/18/24  1152 10/18/24  0628 10/17/24  2357 10/17/24  1413 10/17/24  1059   WBC 10*3/mm3 17.46*  --   --  12.99*  --  17.53*   HEMOGLOBIN g/dL 13.4 13.6 12.9* 12.5*   < > 13.8   HEMATOCRIT % 39.8  --   --  37.5  --  44.1   PLATELETS 10*3/mm3 259  --   --  261  --  339   INR   --   --   --   --   --  1.08    < > = values in this interval not displayed.     Results from last 7 days   Lab Units 10/19/24  0324 10/17/24  2357 10/17/24  1059   SODIUM mmol/L 134* 137 137   POTASSIUM mmol/L  3.9 4.5 4.2   CHLORIDE mmol/L 101 105 100   CO2 mmol/L 22.0 23.8 26.5   BUN mg/dL 15 29* 34*   CREATININE mg/dL 1.14 1.22 1.59*   GLUCOSE mg/dL 126* 94 170*   CALCIUM mg/dL 9.2 8.7 9.7   ALK PHOS U/L 95 89 97   ALT (SGPT) U/L 16 17 20   AST (SGOT) U/L 14 14 17   HSTROP T ng/L  --   --  22*   PROBNP pg/mL  --   --  265.0     Estimated Creatinine Clearance: 67.5 mL/min (by C-G formula based on SCr of 1.14 mg/dL).    Microbiology Results Abnormal       Procedure Component Value - Date/Time    Blood Culture - Blood, Hand, Right [[id number]]  (Normal) Collected: 10/17/24 1325    Lab Status: Preliminary result Specimen: Blood from Hand, Right Updated: 10/18/24 1346     Blood Culture No growth at 24 hours    Blood Culture - Blood, Arm, Left [[id number]]  (Normal) Collected: 10/17/24 1325    Lab Status: Preliminary result Specimen: Blood from Arm, Left Updated: 10/18/24 1346     Blood Culture No growth at 24 hours            Imaging Results (Last 24 Hours)       ** No results found for the last 24 hours. **            Results for orders placed during the hospital encounter of 10/29/19    Adult transthoracic echo complete    Interpretation Summary  · Estimated EF = 56%.  · Left ventricular systolic function is normal.      I have reviewed the medications:  Scheduled Meds:atorvastatin, 40 mg, Oral, Nightly  insulin lispro, 2-7 Units, Subcutaneous, 4x Daily AC & at Bedtime  [START ON 10/20/2024] isosorbide mononitrate, 30 mg, Oral, QAM  [Held by provider] lisinopril, 20 mg, Oral, Daily  melatonin, 2.5 mg, Oral, Nightly  pantoprazole, 40 mg, Oral, BID AC  piperacillin-tazobactam, 3.375 g, Intravenous, Q8H      Continuous Infusions:     PRN Meds:.  acetaminophen    dextrose    dextrose    dextrose    glucagon (human recombinant)    labetalol    melatonin    Morphine    nitroglycerin    ondansetron    ondansetron    sodium chloride    Assessment & Plan   Assessment & Plan     Active Hospital Problems    Diagnosis  POA     **Hematemesis [K92.0]  Yes    Abdominal pain [R10.9]  Yes    Generalized weakness [R53.1]  Yes    Superior mesenteric artery stenosis [K55.1]  Yes    ELVIN (acute kidney injury) [N17.9]  Yes    SIRS (systemic inflammatory response syndrome) [R65.10]  Yes    Lactic acidosis [E87.20]  Yes    Hypertension [I10]  Yes    Diabetes mellitus [E11.9]  Yes    Hyperlipidemia [E78.5]  Yes    Peripheral vascular disease [I73.9]  Yes    Carotid artery stenosis [I65.29]  Yes      Resolved Hospital Problems   No resolved problems to display.        Brief Hospital Course to date:  Kevon Orantes is a 74 y.o. male presents to the hospital with hematemesis, abdominal pain, and general weakness and CT scan shows diffuse gastric wall thickening and high-grade stenosis of the superior mesenteric artery.    Discussion/plan for today: Leukocytosis initially improved but has now returned to white cell count of 17,000.  Tachycardia seems to be mostly related to knee and ankle pain.  Plan to check x-ray of knee and ankle.  If there is an effusion present we may need to ask orthopedic surgery to drain the knee.  Uric acid level is not high and patient did not respond to colchicine making gout less likely.  Continue to follow blood cultures which were reviewed today and are thus far negative.  Unclear if leukocytosis is related to abdominal infection or other etiology.  Continuing empiric antibiotics for now until further testing is completed.  PPI changed from IV to oral formulation to treat gastritis.  Duodenal stricture noted and was dilated by gastroenterology.  Patient aware pathology is pending from his procedure and he will need to follow-up on this.  No further hematemesis however likely etiology of hematemesis was the gastritis.  Glucose reviewed today and is controlled.  I had extensive treatment plan discussion with patient and his family at the bedside.  They are in agreement with current treatment plan and workup plan.      SIRS  concern for infection/possible sepsis from abdominal source with lactic acidosis:  Zosyn and broad-spectrum coverage.  IV fluid    Right knee and ankle pain:  Patient reports previous history of gout.  X-ray 10/19, uric acid level is not elevated and pain did not improve with colchicine initially.    Hematemesis: Resolved  Etiology likely gastritis which was noted on EGD, trend anemia.  PPI.  Gastroenterology following    Duodenal stricture: Status post dilation on EGD 10/18    Prerenal acute kidney injury:  Improved with IV fluid initially.  Trend renal function and renally dose medications.    SMA stenosis:  Evaluated by vascular surgery who feels patient does not have mesenteric ischemia.  Patient plans to follow-up with vascular surgery.    Diabetes: Monitor glucose and adjust insulin as needed.  A1c 6.2.    Essential hypertension: Monitor blood pressure and adjust medicines as needed.  Labetalol as needed for greater than 180.    Hyperlipidemia: Continue statin.  Stable.    DVT Prophylaxis: Mechanical      Disposition: Probably home pending clinical course    CODE STATUS:   Code Status and Medical Interventions: CPR (Attempt to Resuscitate); Full   Ordered at: 10/17/24 2052     Code Status (Patient has no pulse and is not breathing):    CPR (Attempt to Resuscitate)     Medical Interventions (Patient has pulse or is breathing):    Full       Dave Block MD  10/19/24

## 2024-10-20 LAB
ALBUMIN SERPL-MCNC: 3 G/DL (ref 3.5–5.2)
ALBUMIN/GLOB SERPL: 0.9 G/DL
ALP SERPL-CCNC: 82 U/L (ref 39–117)
ALT SERPL W P-5'-P-CCNC: 13 U/L (ref 1–41)
ANION GAP SERPL CALCULATED.3IONS-SCNC: 10 MMOL/L (ref 5–15)
AST SERPL-CCNC: 19 U/L (ref 1–40)
BILIRUB SERPL-MCNC: 0.5 MG/DL (ref 0–1.2)
BUN SERPL-MCNC: 12 MG/DL (ref 8–23)
BUN/CREAT SERPL: 10.7 (ref 7–25)
CALCIUM SPEC-SCNC: 8.6 MG/DL (ref 8.6–10.5)
CHLORIDE SERPL-SCNC: 100 MMOL/L (ref 98–107)
CO2 SERPL-SCNC: 23 MMOL/L (ref 22–29)
CREAT SERPL-MCNC: 1.12 MG/DL (ref 0.76–1.27)
DEPRECATED RDW RBC AUTO: 44.7 FL (ref 37–54)
EGFRCR SERPLBLD CKD-EPI 2021: 68.5 ML/MIN/1.73
ERYTHROCYTE [DISTWIDTH] IN BLOOD BY AUTOMATED COUNT: 13.3 % (ref 12.3–15.4)
GLOBULIN UR ELPH-MCNC: 3.3 GM/DL
GLUCOSE BLDC GLUCOMTR-MCNC: 118 MG/DL (ref 70–130)
GLUCOSE BLDC GLUCOMTR-MCNC: 129 MG/DL (ref 70–130)
GLUCOSE BLDC GLUCOMTR-MCNC: 152 MG/DL (ref 70–130)
GLUCOSE BLDC GLUCOMTR-MCNC: 215 MG/DL (ref 70–130)
GLUCOSE SERPL-MCNC: 156 MG/DL (ref 65–99)
HCT VFR BLD AUTO: 38.4 % (ref 37.5–51)
HGB BLD-MCNC: 12.6 G/DL (ref 13–17.7)
MCH RBC QN AUTO: 30.4 PG (ref 26.6–33)
MCHC RBC AUTO-ENTMCNC: 32.8 G/DL (ref 31.5–35.7)
MCV RBC AUTO: 92.8 FL (ref 79–97)
PLATELET # BLD AUTO: 246 10*3/MM3 (ref 140–450)
PMV BLD AUTO: 11.8 FL (ref 6–12)
POTASSIUM SERPL-SCNC: 4.2 MMOL/L (ref 3.5–5.2)
PROT SERPL-MCNC: 6.3 G/DL (ref 6–8.5)
RBC # BLD AUTO: 4.14 10*6/MM3 (ref 4.14–5.8)
SODIUM SERPL-SCNC: 133 MMOL/L (ref 136–145)
WBC NRBC COR # BLD AUTO: 15.91 10*3/MM3 (ref 3.4–10.8)

## 2024-10-20 PROCEDURE — 97162 PT EVAL MOD COMPLEX 30 MIN: CPT

## 2024-10-20 PROCEDURE — 63710000001 INSULIN LISPRO (HUMAN) PER 5 UNITS: Performed by: INTERNAL MEDICINE

## 2024-10-20 PROCEDURE — 97530 THERAPEUTIC ACTIVITIES: CPT

## 2024-10-20 PROCEDURE — 80053 COMPREHEN METABOLIC PANEL: CPT | Performed by: INTERNAL MEDICINE

## 2024-10-20 PROCEDURE — 82948 REAGENT STRIP/BLOOD GLUCOSE: CPT

## 2024-10-20 PROCEDURE — 85027 COMPLETE CBC AUTOMATED: CPT | Performed by: INTERNAL MEDICINE

## 2024-10-20 RX ORDER — POLYETHYLENE GLYCOL 3350 17 G/17G
17 POWDER, FOR SOLUTION ORAL DAILY PRN
Status: DISCONTINUED | OUTPATIENT
Start: 2024-10-20 | End: 2024-10-21

## 2024-10-20 RX ORDER — COLCHICINE 0.6 MG/1
0.6 TABLET ORAL ONCE
Status: COMPLETED | OUTPATIENT
Start: 2024-10-20 | End: 2024-10-20

## 2024-10-20 RX ORDER — BISACODYL 10 MG
10 SUPPOSITORY, RECTAL RECTAL DAILY PRN
Status: DISCONTINUED | OUTPATIENT
Start: 2024-10-20 | End: 2024-10-21

## 2024-10-20 RX ORDER — AMOXICILLIN 250 MG
2 CAPSULE ORAL 2 TIMES DAILY
Status: DISCONTINUED | OUTPATIENT
Start: 2024-10-20 | End: 2024-10-21

## 2024-10-20 RX ORDER — BISACODYL 5 MG/1
5 TABLET, DELAYED RELEASE ORAL DAILY PRN
Status: DISCONTINUED | OUTPATIENT
Start: 2024-10-20 | End: 2024-10-21

## 2024-10-20 RX ORDER — PANTOPRAZOLE SODIUM 40 MG/1
40 TABLET, DELAYED RELEASE ORAL
Status: DISCONTINUED | OUTPATIENT
Start: 2024-10-21 | End: 2024-10-23 | Stop reason: HOSPADM

## 2024-10-20 RX ADMIN — COLCHICINE 0.6 MG: 0.6 TABLET ORAL at 10:31

## 2024-10-20 RX ADMIN — Medication 2.5 MG: at 20:12

## 2024-10-20 RX ADMIN — TRAMADOL HYDROCHLORIDE 50 MG: 50 TABLET, FILM COATED ORAL at 04:18

## 2024-10-20 RX ADMIN — ISOSORBIDE MONONITRATE 30 MG: 30 TABLET, EXTENDED RELEASE ORAL at 07:55

## 2024-10-20 RX ADMIN — SENNOSIDES AND DOCUSATE SODIUM 2 TABLET: 50; 8.6 TABLET ORAL at 11:04

## 2024-10-20 RX ADMIN — TRAMADOL HYDROCHLORIDE 50 MG: 50 TABLET, FILM COATED ORAL at 21:03

## 2024-10-20 RX ADMIN — SENNOSIDES AND DOCUSATE SODIUM 2 TABLET: 50; 8.6 TABLET ORAL at 20:11

## 2024-10-20 RX ADMIN — PANTOPRAZOLE SODIUM 40 MG: 40 TABLET, DELAYED RELEASE ORAL at 07:55

## 2024-10-20 RX ADMIN — ATORVASTATIN CALCIUM 40 MG: 20 TABLET, FILM COATED ORAL at 20:11

## 2024-10-20 RX ADMIN — INSULIN LISPRO 3 UNITS: 100 INJECTION, SOLUTION INTRAVENOUS; SUBCUTANEOUS at 12:04

## 2024-10-20 RX ADMIN — INSULIN LISPRO 2 UNITS: 100 INJECTION, SOLUTION INTRAVENOUS; SUBCUTANEOUS at 20:10

## 2024-10-20 NOTE — PLAN OF CARE
Goal Outcome Evaluation:  Plan of Care Reviewed With: patient        Progress: improving  Outcome Evaluation: Some complaints of pain in right knee, mostly with movement. PO pain pills given x 2. Awating MD consult. Mild forgettfulness, but otherwise Oriented. Inct of urine.

## 2024-10-20 NOTE — PROGRESS NOTES
[facility] Medicine Services  PROGRESS NOTE    Patient Name: Kevon Orantes  : 1950  MRN: [id number]    Date of Admission: 10/17/2024  Primary Care Physician: Ronni Hernandez MD    Subjective   Subjective     CC:  Follow-up hematemesis    Subjective: Patient says he feels quite a bit better today.  He still having significant right knee tenderness.  The swelling persists.  Abdominal symptoms have now resolved.  No further nausea vomiting or bleeding.    Review of Systems  No current fevers or chills  No current shortness of breath or cough  No current chest pain or palpitations       Objective   Objective     Vital Signs:   Temp:  [97.9 °F (36.6 °C)-98.6 °F (37 °C)] 98.6 °F (37 °C)  Heart Rate:  [] 87  Resp:  [16] 16  BP: (126-184)/() 140/83        Physical Exam:  Constitutional:Awake, alert, elderly appearing  HENT: NCAT, mucous membranes moist, neck supple  Respiratory: No cough or wheezes, normal respirations, nonlabored breathing   Cardiovascular: Pulse rate is normal, palpable radial pulses  Gastrointestinal:  soft, minimal abdominal now nontender, nondistended  Musculoskeletal: Right knee is tender to palpation with swelling as is the right ankle, normal musculature for age, no lower extremity edema, BMI 29  Psychiatric: Appropriate affect, cooperative, conversational  Neurologic: No slurred speech or facial droop, follows commands  Skin: No rashes or jaundice, warm      Results Reviewed:  Results from last 7 days   Lab Units 10/20/24  0538 10/19/24  0324 10/18/24  1152 10/18/24  0628 10/17/24  2357 10/17/24  1413 10/17/24  1059   WBC 10*3/mm3 15.91* 17.46*  --   --  12.99*  --  17.53*   HEMOGLOBIN g/dL 12.6* 13.4 13.6   < > 12.5*   < > 13.8   HEMATOCRIT % 38.4 39.8  --   --  37.5  --  44.1   PLATELETS 10*3/mm3 246 259  --   --  261  --  339   INR   --   --   --   --   --   --  1.08    < > = values in this interval not displayed.     Results from last 7 days   Lab Units  10/20/24  0538 10/19/24  0324 10/17/24  2357 10/17/24  1059   SODIUM mmol/L 133* 134* 137 137   POTASSIUM mmol/L 4.2 3.9 4.5 4.2   CHLORIDE mmol/L 100 101 105 100   CO2 mmol/L 23.0 22.0 23.8 26.5   BUN mg/dL 12 15 29* 34*   CREATININE mg/dL 1.12 1.14 1.22 1.59*   GLUCOSE mg/dL 156* 126* 94 170*   CALCIUM mg/dL 8.6 9.2 8.7 9.7   ALK PHOS U/L 82 95 89 97   ALT (SGPT) U/L 13 16 17 20   AST (SGOT) U/L 19 14 14 17   HSTROP T ng/L  --   --   --  22*   PROBNP pg/mL  --   --   --  265.0     Estimated Creatinine Clearance: 68.7 mL/min (by C-G formula based on SCr of 1.12 mg/dL).    Microbiology Results Abnormal       Procedure Component Value - Date/Time    Blood Culture - Blood, Hand, Right [[id number]]  (Normal) Collected: 10/17/24 1325    Lab Status: Preliminary result Specimen: Blood from Hand, Right Updated: 10/19/24 1345     Blood Culture No growth at 2 days    Blood Culture - Blood, Arm, Left [[id number]]  (Normal) Collected: 10/17/24 1325    Lab Status: Preliminary result Specimen: Blood from Arm, Left Updated: 10/19/24 1345     Blood Culture No growth at 2 days            Imaging Results (Last 24 Hours)       Procedure Component Value Units Date/Time    XR Ankle 3+ View Right [[id number]] Collected: 10/19/24 1541     Updated: 10/19/24 1548    Narrative:      XR ANKLE 3+ VW RIGHT-, XR KNEE 1 OR 2 VW RIGHT-     INDICATIONS: Pain, no injury.     TECHNIQUE: 4 VIEWS OF THE RIGHT ANKLE, 3 VIEWS OF THE RIGHT KNEE     COMPARISON: None available     FINDINGS:     No acute fracture, erosion, or dislocation is identified.     Moderate knee effusion is noted. Mild degenerative spurring is seen at  the knee. Chondral and arterial calcifications are seen at the knee.     Mild to moderate calcaneal spurring is present. A sclerotic focus at the  distal fibula, although nonspecific, may represent bone island. Small  ankle effusion. Arterial calcification is seen in the lower leg.     Follow-up/further evaluation can be obtained as  indications persist.       Impression:         As described.           This report was finalized on 10/19/2024 3:45 PM by Dr. Marcos Whitley M.D on Workstation: [id number]       XR Knee 1 or 2 View Right [[id number]] Collected: 10/19/24 1541     Updated: 10/19/24 1548    Narrative:      XR ANKLE 3+ VW RIGHT-, XR KNEE 1 OR 2 VW RIGHT-     INDICATIONS: Pain, no injury.     TECHNIQUE: 4 VIEWS OF THE RIGHT ANKLE, 3 VIEWS OF THE RIGHT KNEE     COMPARISON: None available     FINDINGS:     No acute fracture, erosion, or dislocation is identified.     Moderate knee effusion is noted. Mild degenerative spurring is seen at  the knee. Chondral and arterial calcifications are seen at the knee.     Mild to moderate calcaneal spurring is present. A sclerotic focus at the  distal fibula, although nonspecific, may represent bone island. Small  ankle effusion. Arterial calcification is seen in the lower leg.     Follow-up/further evaluation can be obtained as indications persist.       Impression:         As described.           This report was finalized on 10/19/2024 3:45 PM by Dr. Marcos Whitley M.D on Workstation: [employer]               Results for orders placed during the hospital encounter of 10/29/19    Adult transthoracic echo complete    Interpretation Summary  · Estimated EF = 56%.  · Left ventricular systolic function is normal.      I have reviewed the medications:  Scheduled Meds:atorvastatin, 40 mg, Oral, Nightly  insulin lispro, 2-7 Units, Subcutaneous, 4x Daily AC & at Bedtime  isosorbide mononitrate, 30 mg, Oral, QAM  [Held by provider] lisinopril, 20 mg, Oral, Daily  melatonin, 2.5 mg, Oral, Nightly  pantoprazole, 40 mg, Oral, BID AC      Continuous Infusions:     PRN Meds:.  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        Brief Hospital Course to date:  Kevon Orantes is a 74 y.o. male presents to the hospital with hematemesis, abdominal pain, and general weakness and CT scan shows diffuse gastric wall thickening and high-grade stenosis of the superior mesenteric artery.    Discussion/plan for today:   Hematemesis resolved.  Probably related to gastritis.  Can change PPI to once daily for probably at least a month.  Zosyn discontinued per surgery recommendations.  Leukocytosis trending down and SIRS resolving.  Most significant issue is severe right knee and ankle pain and x-ray shows effusion.  Orthopedic surgery consulted to assess and drain effusion if possible.  Possible pseudogout.  PT consult as patient is unable to walk on leg due to pain currently. Glucose reviewed today and is controlled.  I had extensive treatment plan discussion with patient and his family at the bedside.  They are in agreement with current treatment plan and workup plan.      SIRS concern for infection/possible sepsis from abdominal source with lactic acidosis:  Zosyn and broad-spectrum coverage provided initially and no clear source identified.  Now improving off antibiotics.  He received IV fluid for SIRS.  Monitoring off fluids and antibiotics for now    Right knee effusion/pain and ankle pain:  Patient reports previous history of gout.  X-ray 10/19 shows  knee and ankle effusion, reports previous history of gout however uric acid level is completely normal making this less likely.  Orthopedic consult to assess and drain effusion if possible    Hematemesis: Resolved  Etiology likely gastritis which was noted on EGD, trend anemia.  PPI changed to oral.  Gastroenterology have signed off    Duodenal stricture: Status post dilation on EGD 10/18    Prerenal acute kidney injury:  Improved with IV fluid initially.  Trend renal function and renally dose medications.    SMA stenosis:  Evaluated by vascular surgery who feels patient does not have mesenteric ischemia.  Patient plans to follow-up with vascular surgery.    Diabetes: Monitor glucose and adjust insulin as needed.  A1c 6.2.    Essential hypertension: Monitor blood pressure and adjust medicines as needed.  Labetalol as needed for greater than 180.    Hyperlipidemia: Continue statin.  Stable.    DVT Prophylaxis: Mechanical      Disposition: Probably home pending clinical course    CODE STATUS:   Code Status and Medical Interventions: CPR (Attempt to Resuscitate); Full   Ordered at: 10/17/24 2052     Code Status (Patient has no pulse and is not breathing):    CPR (Attempt to Resuscitate)     Medical Interventions (Patient has pulse or is breathing):    Full       Dave Block MD  10/20/24

## 2024-10-20 NOTE — PLAN OF CARE
Goal Outcome Evaluation:  Plan of Care Reviewed With: patient        Progress: no change  Outcome Evaluation: Patient is a 75 y/o male admitted to [facility] for hematemesis and new onset of RLE pain. He currenly lives with his wife and has 4 RICKY. Prior to admission he was indep with all mobility and currently working. Today he presents with mobility below baseline, impaired balance, and generalize LE weakness. He required modA for bed mobility. He moved slowly d/t increased pain in RLE. Transfers were deferred this session d/t extreme pain in RLE. Patient will benefit from continued skilled PT addressing limitations in functional mobility to maximize safety and independence. Therapy recommendations will continue to be assessed as pt progresses.    Anticipated Discharge Disposition (PT): skilled nursing facility, home with home health, home with assist
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
## 2024-10-20 NOTE — PLAN OF CARE
Problem: Adult Inpatient Plan of Care  Goal: Plan of Care Review  Outcome: Progressing  Flowsheets (Taken 10/20/2024 1816)  Progress: no change  Outcome Evaluation: Patient reports pain with activity, but denies need for PRN management. He attempted to work with PT. Plan for joint aspiration tomorrow as radiologist was unavailable today. Patient's spouse at bedside updated on plan of care.  Plan of Care Reviewed With:   patient   spouse
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
## 2024-10-21 ENCOUNTER — APPOINTMENT (OUTPATIENT)
Dept: GENERAL RADIOLOGY | Facility: HOSPITAL | Age: 74
End: 2024-10-21
Payer: MEDICARE

## 2024-10-21 LAB
ALBUMIN SERPL-MCNC: 2.9 G/DL (ref 3.5–5.2)
ALBUMIN/GLOB SERPL: 0.9 G/DL
ALP SERPL-CCNC: 92 U/L (ref 39–117)
ALT SERPL W P-5'-P-CCNC: 28 U/L (ref 1–41)
ANION GAP SERPL CALCULATED.3IONS-SCNC: 9 MMOL/L (ref 5–15)
APPEARANCE FLD: ABNORMAL
AST SERPL-CCNC: 27 U/L (ref 1–40)
BILIRUB SERPL-MCNC: 0.4 MG/DL (ref 0–1.2)
BUN SERPL-MCNC: 16 MG/DL (ref 8–23)
BUN/CREAT SERPL: 16 (ref 7–25)
CALCIUM SPEC-SCNC: 8.5 MG/DL (ref 8.6–10.5)
CHLORIDE SERPL-SCNC: 100 MMOL/L (ref 98–107)
CO2 SERPL-SCNC: 23 MMOL/L (ref 22–29)
COLOR FLD: YELLOW
CREAT SERPL-MCNC: 1 MG/DL (ref 0.76–1.27)
CRYSTALS FLD MICRO: NORMAL
CYTO UR: NORMAL
DEPRECATED RDW RBC AUTO: 45.7 FL (ref 37–54)
EGFRCR SERPLBLD CKD-EPI 2021: 78.5 ML/MIN/1.73
ERYTHROCYTE [DISTWIDTH] IN BLOOD BY AUTOMATED COUNT: 13.6 % (ref 12.3–15.4)
GLOBULIN UR ELPH-MCNC: 3.4 GM/DL
GLUCOSE BLDC GLUCOMTR-MCNC: 123 MG/DL (ref 70–130)
GLUCOSE BLDC GLUCOMTR-MCNC: 126 MG/DL (ref 70–130)
GLUCOSE BLDC GLUCOMTR-MCNC: 151 MG/DL (ref 70–130)
GLUCOSE BLDC GLUCOMTR-MCNC: 194 MG/DL (ref 70–130)
GLUCOSE SERPL-MCNC: 139 MG/DL (ref 65–99)
HCT VFR BLD AUTO: 36.9 % (ref 37.5–51)
HGB BLD-MCNC: 12.7 G/DL (ref 13–17.7)
LAB AP CASE REPORT: NORMAL
MCH RBC QN AUTO: 31.8 PG (ref 26.6–33)
MCHC RBC AUTO-ENTMCNC: 34.4 G/DL (ref 31.5–35.7)
MCV RBC AUTO: 92.3 FL (ref 79–97)
METHOD: ABNORMAL
MONOS+MACROS NFR FLD: 6 %
NEUTROPHILS NFR FLD MANUAL: 94 %
NUC CELL # FLD: ABNORMAL /MM3
PATH REPORT.FINAL DX SPEC: NORMAL
PATH REPORT.GROSS SPEC: NORMAL
PLATELET # BLD AUTO: 267 10*3/MM3 (ref 140–450)
PMV BLD AUTO: 11.5 FL (ref 6–12)
POTASSIUM SERPL-SCNC: 3.8 MMOL/L (ref 3.5–5.2)
PROT SERPL-MCNC: 6.3 G/DL (ref 6–8.5)
RBC # BLD AUTO: 4 10*6/MM3 (ref 4.14–5.8)
RBC # FLD AUTO: 2000 /MM3
SODIUM SERPL-SCNC: 132 MMOL/L (ref 136–145)
WBC NRBC COR # BLD AUTO: 11.5 10*3/MM3 (ref 3.4–10.8)

## 2024-10-21 PROCEDURE — 89060 EXAM SYNOVIAL FLUID CRYSTALS: CPT | Performed by: PHYSICIAN ASSISTANT

## 2024-10-21 PROCEDURE — 89051 BODY FLUID CELL COUNT: CPT | Performed by: PHYSICIAN ASSISTANT

## 2024-10-21 PROCEDURE — 97530 THERAPEUTIC ACTIVITIES: CPT

## 2024-10-21 PROCEDURE — 25010000002 LIDOCAINE 1 % SOLUTION: Performed by: INTERNAL MEDICINE

## 2024-10-21 PROCEDURE — 63710000001 INSULIN LISPRO (HUMAN) PER 5 UNITS: Performed by: INTERNAL MEDICINE

## 2024-10-21 PROCEDURE — 87070 CULTURE OTHR SPECIMN AEROBIC: CPT | Performed by: PHYSICIAN ASSISTANT

## 2024-10-21 PROCEDURE — 25010000002 HYDROMORPHONE PER 4 MG: Performed by: STUDENT IN AN ORGANIZED HEALTH CARE EDUCATION/TRAINING PROGRAM

## 2024-10-21 PROCEDURE — 77002 NEEDLE LOCALIZATION BY XRAY: CPT

## 2024-10-21 PROCEDURE — 87205 SMEAR GRAM STAIN: CPT | Performed by: PHYSICIAN ASSISTANT

## 2024-10-21 PROCEDURE — 85027 COMPLETE CBC AUTOMATED: CPT | Performed by: INTERNAL MEDICINE

## 2024-10-21 PROCEDURE — 82948 REAGENT STRIP/BLOOD GLUCOSE: CPT

## 2024-10-21 PROCEDURE — 80053 COMPREHEN METABOLIC PANEL: CPT | Performed by: INTERNAL MEDICINE

## 2024-10-21 PROCEDURE — 87075 CULTR BACTERIA EXCEPT BLOOD: CPT | Performed by: PHYSICIAN ASSISTANT

## 2024-10-21 RX ORDER — LIDOCAINE HYDROCHLORIDE 10 MG/ML
10 INJECTION, SOLUTION INFILTRATION; PERINEURAL ONCE
Status: DISCONTINUED | OUTPATIENT
Start: 2024-10-21 | End: 2024-10-22

## 2024-10-21 RX ORDER — BISACODYL 5 MG/1
5 TABLET, DELAYED RELEASE ORAL DAILY PRN
Status: DISCONTINUED | OUTPATIENT
Start: 2024-10-21 | End: 2024-10-23 | Stop reason: HOSPADM

## 2024-10-21 RX ORDER — AMOXICILLIN 250 MG
2 CAPSULE ORAL 2 TIMES DAILY
Status: DISCONTINUED | OUTPATIENT
Start: 2024-10-21 | End: 2024-10-23 | Stop reason: HOSPADM

## 2024-10-21 RX ORDER — POLYETHYLENE GLYCOL 3350 17 G/17G
17 POWDER, FOR SOLUTION ORAL DAILY
Status: DISCONTINUED | OUTPATIENT
Start: 2024-10-21 | End: 2024-10-23 | Stop reason: HOSPADM

## 2024-10-21 RX ORDER — BISACODYL 10 MG
10 SUPPOSITORY, RECTAL RECTAL DAILY PRN
Status: DISCONTINUED | OUTPATIENT
Start: 2024-10-21 | End: 2024-10-23 | Stop reason: HOSPADM

## 2024-10-21 RX ORDER — LIDOCAINE HYDROCHLORIDE 10 MG/ML
10 INJECTION, SOLUTION INFILTRATION; PERINEURAL ONCE
Status: COMPLETED | OUTPATIENT
Start: 2024-10-21 | End: 2024-10-21

## 2024-10-21 RX ADMIN — SENNOSIDES AND DOCUSATE SODIUM 2 TABLET: 50; 8.6 TABLET ORAL at 08:36

## 2024-10-21 RX ADMIN — INSULIN LISPRO 2 UNITS: 100 INJECTION, SOLUTION INTRAVENOUS; SUBCUTANEOUS at 21:10

## 2024-10-21 RX ADMIN — PANTOPRAZOLE SODIUM 40 MG: 40 TABLET, DELAYED RELEASE ORAL at 06:20

## 2024-10-21 RX ADMIN — ISOSORBIDE MONONITRATE 30 MG: 30 TABLET, EXTENDED RELEASE ORAL at 06:20

## 2024-10-21 RX ADMIN — ATORVASTATIN CALCIUM 40 MG: 20 TABLET, FILM COATED ORAL at 21:10

## 2024-10-21 RX ADMIN — SENNOSIDES AND DOCUSATE SODIUM 2 TABLET: 50; 8.6 TABLET ORAL at 21:09

## 2024-10-21 RX ADMIN — HYDROMORPHONE HYDROCHLORIDE 0.5 MG: 1 INJECTION, SOLUTION INTRAMUSCULAR; INTRAVENOUS; SUBCUTANEOUS at 21:10

## 2024-10-21 RX ADMIN — LIDOCAINE HYDROCHLORIDE 3 ML: 10 INJECTION, SOLUTION INFILTRATION; PERINEURAL at 12:08

## 2024-10-21 RX ADMIN — INSULIN LISPRO 2 UNITS: 100 INJECTION, SOLUTION INTRAVENOUS; SUBCUTANEOUS at 17:51

## 2024-10-21 RX ADMIN — TRAMADOL HYDROCHLORIDE 50 MG: 50 TABLET, FILM COATED ORAL at 04:06

## 2024-10-21 RX ADMIN — TRAMADOL HYDROCHLORIDE 50 MG: 50 TABLET, FILM COATED ORAL at 19:43

## 2024-10-21 RX ADMIN — Medication 2.5 MG: at 21:09

## 2024-10-21 RX ADMIN — POLYETHYLENE GLYCOL 3350 17 G: 17 POWDER, FOR SOLUTION ORAL at 15:12

## 2024-10-21 NOTE — CASE MANAGEMENT/SOCIAL WORK
Discharge Planning Assessment  [facility]     Patient Name: Kevon Orantes  MRN: [id number]  Today's Date: 10/21/2024    Admit Date: 10/17/2024    Plan: Return home with wife   Discharge Needs Assessment       Row Name 10/21/24 1324       Living Environment    People in Home spouse    Current Living Arrangements home    Primary Care Provided by self    Provides Primary Care For no one    Family Caregiver if Needed spouse    Quality of Family Relationships involved;helpful    Able to Return to Prior Arrangements yes       Resource/Environmental Concerns    Resource/Environmental Concerns none    Transportation Concerns none       Transition Planning    Patient/Family Anticipates Transition to home with family    Transportation Anticipated family or friend will provide       Discharge Needs Assessment    Readmission Within the Last 30 Days no previous admission in last 30 days    Equipment Currently Used at Home none    Concerns to be Addressed discharge planning                   Discharge Plan       Row Name 10/21/24 1324       Plan    Plan Return home with wife    Patient/Family in Agreement with Plan yes    Plan Comments CCP met with patient and wife, Negar, at bedside. Introduced self and explained role. Patient lives with his wife. At baseline he drives himself to appointments, is IADL, and uses no DME. He sees Dr. Eisenmenger for PCP, fills prescriptions at [facility] on [address], and has an AD/LW. He denies any DME use, HH, or [facility] history. At discharge he plans to return home with his wife. CCP discussed HH/SNF with patient and wife according to current PT recommendations and at this time they hope to return home. They discussed potential for needing a walker and would like to use [facility] if patient decides he would like a walker. CCP following clinical course for any discharge planning needs. Triny HERNANDEZ RN/CCP                  Continued Care and Services - Admitted Since 10/17/2024    No active coordination exists  for this encounter.       Expected Discharge Date and Time       Expected Discharge Date Expected Discharge Time    Oct 23, 2024            Demographic Summary       Row Name 10/21/24 1318       General Information    Admission Type inpatient    Arrived From home    Required Notices Provided Important Message from Medicare    Referral Source admission list                   Functional Status       Row Name 10/21/24 1323       Functional Status    Usual Activity Tolerance good    Current Activity Tolerance moderate       Functional Status, IADL    Medications independent    Meal Preparation independent    Housekeeping independent    Laundry independent    Shopping independent                   Psychosocial    No documentation.                  Abuse/Neglect    No documentation.                  Legal    No documentation.                  Substance Abuse    No documentation.                  Patient Forms    No documentation.                     Edwina Soares

## 2024-10-21 NOTE — PLAN OF CARE
Problem: Adult Inpatient Plan of Care  Goal: Plan of Care Review  Outcome: Progressing  Flowsheets (Taken 10/21/2024 1802)  Progress: improving  Outcome Evaluation: Pt vitals stable. Knee aspirated today. Pt able to sit on edge of bed. Prn meds for pain. Bowel regimen started. Pt safety maintained  Plan of Care Reviewed With: patient

## 2024-10-21 NOTE — PLAN OF CARE
Goal Outcome Evaluation:  Plan of Care Reviewed With: patient, spouse        Progress: improving  Outcome Evaluation: Pt was seen for PT tx this PM. Pt was in bed and req min A w/ R LE being held off mattress as pt sat up to EOB. Pt attempted 3 sit to stands req max A x 2/fww w/ pt able to complete 3rd. Pt stood approx 2 min w/ CGA x 2 and use of fww for support. Pt was able to take 1 step w/ each foot laterally to HOB although pt slide R foot req CGA and use of fww. Pain limiting. Pt returned to bed w/ mod A. PT will prog as pt rachel.    Anticipated Discharge Disposition (PT): skilled nursing facility, home with 24/7 care, home with home health

## 2024-10-21 NOTE — PROGRESS NOTES
" LOS: 3 days     Name: Kevon Orantes  Age: 74 y.o.  Sex: male  :  1950  MRN: [id number]         Primary Care Physician: Ronni Hernandez MD    Subjective   Subjective  Main complaint is that of pain in the right knee.  Otherwise no acute overnight events.    Objective   Vital Signs  Temp:  [97.5 °F (36.4 °C)-99.1 °F (37.3 °C)] 97.9 °F (36.6 °C)  Heart Rate:  [] 90  Resp:  [16-18] 16  BP: (114-131)/(61-69) 114/69  Body mass index is 29 kg/m².    Objective:  General Appearance:  Comfortable and in no acute distress.    Vital signs: (most recent): Blood pressure 114/69, pulse 90, temperature 97.9 °F (36.6 °C), temperature source Oral, resp. rate 16, height 182.9 cm (72\"), weight 97 kg (213 lb 13.5 oz), SpO2 93%.    Lungs:  Normal effort and normal respiratory rate.  He is not in respiratory distress.  There are decreased breath sounds.    Heart: Normal rate.  Regular rhythm.    Abdomen: Abdomen is soft.  Bowel sounds are normal.   There is no abdominal tenderness.     Extremities: There is no dependent edema or local swelling.  (Right knee is swollen)  Neurological: Patient is alert and oriented to person, place and time.    Skin:  Warm and dry.                Results Review:       I reviewed the patient's new clinical results.    Results from last 7 days   Lab Units 10/21/24  0348 10/20/24  0538 10/19/24  0324 10/18/24  1152 10/18/24  0628 10/17/24  2357 10/17/24  1942 10/17/24  1413 10/17/24  1059   WBC 10*3/mm3 11.50* 15.91* 17.46*  --   --  12.99*  --   --  17.53*   HEMOGLOBIN g/dL 12.7* 12.6* 13.4 13.6 12.9* 12.5* 12.9*   < > 13.8   PLATELETS 10*3/mm3 267 246 259  --   --  261  --   --  339    < > = values in this interval not displayed.     Results from last 7 days   Lab Units 10/21/24  0348 10/20/24  0538 10/19/24  0324 10/17/24  2357 10/17/24  1059   SODIUM mmol/L 132* 133* 134* 137 137   POTASSIUM mmol/L 3.8 4.2 3.9 4.5 4.2   CHLORIDE mmol/L 100 100 101 105 100   CO2 mmol/L 23.0 23.0 22.0 23.8 " 26.5   BUN mg/dL 16 12 15 29* 34*   CREATININE mg/dL 1.00 1.12 1.14 1.22 1.59*   CALCIUM mg/dL 8.5* 8.6 9.2 8.7 9.7   GLUCOSE mg/dL 139* 156* 126* 94 170*     Results from last 7 days   Lab Units 10/17/24  1059   INR  1.08             Scheduled Meds:   atorvastatin, 40 mg, Oral, Nightly  insulin lispro, 2-7 Units, Subcutaneous, 4x Daily AC & at Bedtime  isosorbide mononitrate, 30 mg, Oral, QAM  lidocaine, 10 mL, Intradermal, Once  [Held by provider] lisinopril, 20 mg, Oral, Daily  melatonin, 2.5 mg, Oral, Nightly  pantoprazole, 40 mg, Oral, Q AM  senna-docusate sodium, 2 tablet, Oral, BID   And  polyethylene glycol, 17 g, Oral, Daily      PRN Meds:     acetaminophen    senna-docusate sodium **AND** polyethylene glycol **AND** bisacodyl **AND** bisacodyl    dextrose    dextrose    dextrose    glucagon (human recombinant)    HYDROmorphone    labetalol    melatonin    Morphine    nitroglycerin    ondansetron    ondansetron    sodium chloride    traMADol  Continuous Infusions:       Assessment & Plan   Active Hospital Problems    Diagnosis  POA    **Hematemesis [K92.0]  Yes    Abdominal pain [R10.9]  Yes    Generalized weakness [R53.1]  Yes    Superior mesenteric artery stenosis [K55.1]  Yes    ELVIN (acute kidney injury) [N17.9]  Yes    SIRS (systemic inflammatory response syndrome) [R65.10]  Yes    Lactic acidosis [E87.20]  Yes    Hypertension [I10]  Yes    Diabetes mellitus [E11.9]  Yes    Hyperlipidemia [E78.5]  Yes    Peripheral vascular disease [I73.9]  Yes    Carotid artery stenosis [I65.29]  Yes      Resolved Hospital Problems   No resolved problems to display.       Assessment & Plan    Brief Hospital Course to date:  Kevon Orantes is a 74 y.o. male presents to the hospital with hematemesis, abdominal pain, and general weakness and CT scan shows diffuse gastric wall thickening and high-grade stenosis of the superior mesenteric artery.    SIRS concern for infection/possible sepsis from abdominal source with lactic  acidosis:  Zosyn and broad-spectrum coverage provided initially and no clear source identified.  Now improving off antibiotics.  He received IV fluid for SIRS.  Monitoring off fluids and antibiotics for now     Right knee effusion/pain and ankle pain:  Patient reports previous history of gout.  X-ray 10/19 shows knee effusion.  Going for arthrocentesis of the knee today.  Further plans as per orthopedic surgery     Hematemesis: Resolved  Etiology likely gastritis which was noted on EGD, trend anemia.  PPI changed to oral.  Gastroenterology have signed off     Duodenal stricture: Status post dilation on EGD 10/18     Prerenal acute kidney injury:  Improved with IV fluid initially.  Trend renal function and renally dose medications.     SMA stenosis:  Evaluated by vascular surgery who feels patient does not have mesenteric ischemia.  Patient plans to follow-up with vascular surgery.     Diabetes: Monitor glucose and adjust insulin as needed.  A1c 6.2.     Essential hypertension: Monitor blood pressure and adjust medicines as needed.  Labetalol as needed for greater than 180.     Hyperlipidemia: Continue statin.  Stable.     DVT Prophylaxis: Mechanical        Disposition: Probably home pending clinical course      Expected Discharge Date: 10/23/2024; Expected Discharge Time:      Aidan Virk MD  [facility]  10/21/24  12:41 EDT
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
## 2024-10-21 NOTE — PLAN OF CARE
Goal Outcome Evaluation:  Plan of Care Reviewed With: patient           Outcome Evaluation: Patient complains of pain in knee of RLE, elevated on pillow for comfort, managed with PRN tramadol x2. Joint aspiration planned for 10/21. Plan of care ongoing.

## 2024-10-22 LAB
ANION GAP SERPL CALCULATED.3IONS-SCNC: 9.3 MMOL/L (ref 5–15)
BACTERIA SPEC AEROBE CULT: NORMAL
BACTERIA SPEC AEROBE CULT: NORMAL
BUN SERPL-MCNC: 15 MG/DL (ref 8–23)
BUN/CREAT SERPL: 16 (ref 7–25)
CALCIUM SPEC-SCNC: 8.9 MG/DL (ref 8.6–10.5)
CHLORIDE SERPL-SCNC: 100 MMOL/L (ref 98–107)
CO2 SERPL-SCNC: 24.7 MMOL/L (ref 22–29)
CREAT SERPL-MCNC: 0.94 MG/DL (ref 0.76–1.27)
DEPRECATED RDW RBC AUTO: 44.2 FL (ref 37–54)
EGFRCR SERPLBLD CKD-EPI 2021: 84.5 ML/MIN/1.73
ERYTHROCYTE [DISTWIDTH] IN BLOOD BY AUTOMATED COUNT: 13.4 % (ref 12.3–15.4)
GLUCOSE BLDC GLUCOMTR-MCNC: 140 MG/DL (ref 70–130)
GLUCOSE BLDC GLUCOMTR-MCNC: 168 MG/DL (ref 70–130)
GLUCOSE BLDC GLUCOMTR-MCNC: 221 MG/DL (ref 70–130)
GLUCOSE BLDC GLUCOMTR-MCNC: 228 MG/DL (ref 70–130)
GLUCOSE SERPL-MCNC: 123 MG/DL (ref 65–99)
HCT VFR BLD AUTO: 35.5 % (ref 37.5–51)
HGB BLD-MCNC: 11.9 G/DL (ref 13–17.7)
MCH RBC QN AUTO: 30.5 PG (ref 26.6–33)
MCHC RBC AUTO-ENTMCNC: 33.5 G/DL (ref 31.5–35.7)
MCV RBC AUTO: 91 FL (ref 79–97)
PLATELET # BLD AUTO: 289 10*3/MM3 (ref 140–450)
PMV BLD AUTO: 11.5 FL (ref 6–12)
POTASSIUM SERPL-SCNC: 3.8 MMOL/L (ref 3.5–5.2)
RBC # BLD AUTO: 3.9 10*6/MM3 (ref 4.14–5.8)
SODIUM SERPL-SCNC: 134 MMOL/L (ref 136–145)
WBC NRBC COR # BLD AUTO: 10.78 10*3/MM3 (ref 3.4–10.8)

## 2024-10-22 PROCEDURE — 82948 REAGENT STRIP/BLOOD GLUCOSE: CPT

## 2024-10-22 PROCEDURE — 80048 BASIC METABOLIC PNL TOTAL CA: CPT | Performed by: INTERNAL MEDICINE

## 2024-10-22 PROCEDURE — 63710000001 METHYLPREDNISOLONE 4 MG TABLET THERAPY PACK 21 EACH DISP PACK: Performed by: ORTHOPAEDIC SURGERY

## 2024-10-22 PROCEDURE — 85027 COMPLETE CBC AUTOMATED: CPT | Performed by: INTERNAL MEDICINE

## 2024-10-22 PROCEDURE — 63710000001 INSULIN LISPRO (HUMAN) PER 5 UNITS: Performed by: INTERNAL MEDICINE

## 2024-10-22 PROCEDURE — 97530 THERAPEUTIC ACTIVITIES: CPT

## 2024-10-22 PROCEDURE — 3E0U33Z INTRODUCTION OF ANTI-INFLAMMATORY INTO JOINTS, PERCUTANEOUS APPROACH: ICD-10-PCS | Performed by: PHYSICIAN ASSISTANT

## 2024-10-22 RX ORDER — METHYLPREDNISOLONE 4 MG
4 TABLET, DOSE PACK ORAL
Status: DISCONTINUED | OUTPATIENT
Start: 2024-10-27 | End: 2024-10-22

## 2024-10-22 RX ORDER — METHYLPREDNISOLONE ACETATE 40 MG/ML
80 INJECTION, SUSPENSION INTRA-ARTICULAR; INTRALESIONAL; INTRAMUSCULAR; SOFT TISSUE ONCE
Status: DISCONTINUED | OUTPATIENT
Start: 2024-10-22 | End: 2024-10-22

## 2024-10-22 RX ORDER — COLCHICINE 0.6 MG/1
0.6 TABLET ORAL ONCE
Status: COMPLETED | OUTPATIENT
Start: 2024-10-22 | End: 2024-10-22

## 2024-10-22 RX ORDER — LIDOCAINE HYDROCHLORIDE 10 MG/ML
10 INJECTION, SOLUTION INFILTRATION; PERINEURAL ONCE
Status: DISCONTINUED | OUTPATIENT
Start: 2024-10-22 | End: 2024-10-22

## 2024-10-22 RX ORDER — METHYLPREDNISOLONE 4 MG
8 TABLET, DOSE PACK ORAL
Status: COMPLETED | OUTPATIENT
Start: 2024-10-22 | End: 2024-10-22

## 2024-10-22 RX ORDER — METHYLPREDNISOLONE 4 MG
4 TABLET, DOSE PACK ORAL
Status: DISCONTINUED | OUTPATIENT
Start: 2024-10-26 | End: 2024-10-22

## 2024-10-22 RX ORDER — METHYLPREDNISOLONE 4 MG
8 TABLET, DOSE PACK ORAL
Status: DISCONTINUED | OUTPATIENT
Start: 2024-10-23 | End: 2024-10-22

## 2024-10-22 RX ORDER — METHYLPREDNISOLONE 4 MG
8 TABLET, DOSE PACK ORAL
Status: DISCONTINUED | OUTPATIENT
Start: 2024-10-22 | End: 2024-10-22

## 2024-10-22 RX ORDER — COLCHICINE 0.6 MG/1
1.2 TABLET ORAL ONCE
Status: COMPLETED | OUTPATIENT
Start: 2024-10-22 | End: 2024-10-22

## 2024-10-22 RX ORDER — METHYLPREDNISOLONE 4 MG
4 TABLET, DOSE PACK ORAL
Status: DISCONTINUED | OUTPATIENT
Start: 2024-10-25 | End: 2024-10-22

## 2024-10-22 RX ORDER — METHYLPREDNISOLONE ACETATE 40 MG/ML
80 INJECTION, SUSPENSION INTRA-ARTICULAR; INTRALESIONAL; INTRAMUSCULAR; SOFT TISSUE ONCE
Status: DISCONTINUED | OUTPATIENT
Start: 2024-10-22 | End: 2024-10-23 | Stop reason: HOSPADM

## 2024-10-22 RX ORDER — METHYLPREDNISOLONE 4 MG
4 TABLET, DOSE PACK ORAL
Status: COMPLETED | OUTPATIENT
Start: 2024-10-22 | End: 2024-10-22

## 2024-10-22 RX ORDER — LIDOCAINE HYDROCHLORIDE 10 MG/ML
10 INJECTION, SOLUTION INFILTRATION; PERINEURAL ONCE
Status: DISCONTINUED | OUTPATIENT
Start: 2024-10-22 | End: 2024-10-23 | Stop reason: HOSPADM

## 2024-10-22 RX ORDER — METHYLPREDNISOLONE 4 MG
4 TABLET, DOSE PACK ORAL
Status: DISCONTINUED | OUTPATIENT
Start: 2024-10-24 | End: 2024-10-22

## 2024-10-22 RX ORDER — METHYLPREDNISOLONE 4 MG
4 TABLET, DOSE PACK ORAL
Status: DISCONTINUED | OUTPATIENT
Start: 2024-10-23 | End: 2024-10-22

## 2024-10-22 RX ORDER — METHYLPREDNISOLONE 4 MG
4 TABLET, DOSE PACK ORAL
Status: DISCONTINUED | OUTPATIENT
Start: 2024-10-22 | End: 2024-10-22

## 2024-10-22 RX ADMIN — SENNOSIDES AND DOCUSATE SODIUM 2 TABLET: 50; 8.6 TABLET ORAL at 20:56

## 2024-10-22 RX ADMIN — METHYLPREDNISOLONE 4 MG: 4 TABLET ORAL at 12:24

## 2024-10-22 RX ADMIN — COLCHICINE 1.2 MG: 0.6 TABLET ORAL at 11:31

## 2024-10-22 RX ADMIN — METHYLPREDNISOLONE 8 MG: 4 TABLET ORAL at 09:12

## 2024-10-22 RX ADMIN — INSULIN LISPRO 2 UNITS: 100 INJECTION, SOLUTION INTRAVENOUS; SUBCUTANEOUS at 20:59

## 2024-10-22 RX ADMIN — COLCHICINE 0.6 MG: 0.6 TABLET ORAL at 13:49

## 2024-10-22 RX ADMIN — TRAMADOL HYDROCHLORIDE 50 MG: 50 TABLET, FILM COATED ORAL at 09:12

## 2024-10-22 RX ADMIN — PANTOPRAZOLE SODIUM 40 MG: 40 TABLET, DELAYED RELEASE ORAL at 06:21

## 2024-10-22 RX ADMIN — TRAMADOL HYDROCHLORIDE 50 MG: 50 TABLET, FILM COATED ORAL at 16:50

## 2024-10-22 RX ADMIN — SENNOSIDES AND DOCUSATE SODIUM 2 TABLET: 50; 8.6 TABLET ORAL at 09:12

## 2024-10-22 RX ADMIN — ISOSORBIDE MONONITRATE 30 MG: 30 TABLET, EXTENDED RELEASE ORAL at 06:21

## 2024-10-22 RX ADMIN — POLYETHYLENE GLYCOL 3350 17 G: 17 POWDER, FOR SOLUTION ORAL at 09:12

## 2024-10-22 RX ADMIN — COLCHICINE 0.6 MG: 0.6 TABLET ORAL at 12:23

## 2024-10-22 RX ADMIN — INSULIN LISPRO 3 UNITS: 100 INJECTION, SOLUTION INTRAVENOUS; SUBCUTANEOUS at 16:50

## 2024-10-22 RX ADMIN — INSULIN LISPRO 3 UNITS: 100 INJECTION, SOLUTION INTRAVENOUS; SUBCUTANEOUS at 12:23

## 2024-10-22 RX ADMIN — Medication 2.5 MG: at 20:56

## 2024-10-22 RX ADMIN — ATORVASTATIN CALCIUM 40 MG: 20 TABLET, FILM COATED ORAL at 20:56

## 2024-10-22 NOTE — PROGRESS NOTES
DATE OF ADMISSION: 10/17/2024 10:26 AM     LOS: 4 days     Subjective :   Right knee feeling a little better.      Objective :    Vital signs in last 24 hours:  Vitals:    10/21/24 1300 10/21/24 1930 10/21/24 2315 10/22/24 0530   BP: 113/48 156/92 124/66    BP Location: Right arm Right arm Right arm    Patient Position: Lying Lying Lying    Pulse: 91 101 88    Resp: 16 15 17    Temp: 97.3 °F (36.3 °C) 98.6 °F (37 °C) 97.7 °F (36.5 °C)    TempSrc: Oral Oral Oral    SpO2: 92% 100% 93%    Weight:    97.7 kg (215 lb 6.2 oz)   Height:         Body mass index is 29.21 kg/m².    PHYSICAL EXAM:  Patient is calm, in no acute distress, awake and oriented x 3.  Skin on right knee is clean, dry and intact.  No signs of infection.  Mild effusion present.  Homans test is negative.  Patient is neurovascularly intact distally.    LABS:  Results from last 7 days   Lab Units 10/22/24  0257   WBC 10*3/mm3 10.78   HEMOGLOBIN g/dL 11.9*   HEMATOCRIT % 35.5*   PLATELETS 10*3/mm3 289     Results from last 7 days   Lab Units 10/22/24  0257   SODIUM mmol/L 134*   POTASSIUM mmol/L 3.8   CHLORIDE mmol/L 100   CO2 mmol/L 24.7   BUN mg/dL 15   CREATININE mg/dL 0.94   GLUCOSE mg/dL 123*   CALCIUM mg/dL 8.9     Results from last 7 days   Lab Units 10/17/24  1059   INR  1.08   APTT seconds 26.3         Component 1 d ago   Crystals, Fluid Intra and Extracellular crystals observed exhibiting polarization characteristics of Uric Acid          ASSESSMENT:  Acute Gout right knee    Plan:  Will prescribe colchicine for acute gout attack.  I ordered steroid and lidocaine for injection but was not ready yet this am.   Will order medrol dose pack instead.  Patient will discuss with PCP about adjusting his maintenance gout medications as he has several episodes recently since his medications have been switched.  He already has an appt next week per patient.  Will sign off.  Follow up prn.    Aftab Torrez MD    Date: 10/22/2024  Time: 07:17  EDT

## 2024-10-22 NOTE — PROCEDURES
Procedure: Right Knee Steroid Injection    Saw patient today to administer right knee steroid injection for treatment of acute gout.  After risk and benefits discussed patient would like to proceed.  Proper anatomic site was identified and the medial border of the right knee was marked.  Injection site was then prepped with Betadine and alcohol pads.  An injection of 2 cc of 1% lidocaine and 1 cc of 40 mg Depo-Medrol was administered via a 25-gauge needle.  Injection was performed without complications.  No skin blanching noted.  No bleeding noted.  Clean Band-Aid was placed over the site.    Discussed with patient that injection can take 1 to 2 weeks to take full effect.  Advised gentle return to ambulation and range of motion of the knee.  Postinjection complications discussed.    Will sign off at this time.  Please reconsult if further issues arise or further input needed.

## 2024-10-22 NOTE — PLAN OF CARE
Goal Outcome Evaluation:   Request for another pain medicine for his rt knee pain, medicated this time with dilaudid 0.5 mg IV and was able to sleep and rest. VS stable , no other complaints made. Care plan on going.

## 2024-10-22 NOTE — PLAN OF CARE
Problem: Adult Inpatient Plan of Care  Goal: Plan of Care Review  Outcome: Progressing  Flowsheets (Taken 10/22/2024 1800)  Progress: improving  Outcome Evaluation: Pt vitals stable. Pt was able to get up to the chair today. Oral steroid started but dc'd. Ortho to come and inject right knee. Supplies at the bedside. Prn meds for pain. Pt safety maintained  Plan of Care Reviewed With: patient

## 2024-10-22 NOTE — PROGRESS NOTES
" LOS: 4 days     Name: Kevon Orantes  Age: 74 y.o.  Sex: male  :  1950  MRN: [id number]         Primary Care Physician: Eisenmenger, Taylor,     Subjective   Subjective  Reports some slight improvement of his right knee.  Having trouble flexing or extending the knee.  Still having trouble weightbearing.  No additional hematemesis.  No abdominal pain.  Eager for discharge.    Objective   Vital Signs  Temp:  [97.3 °F (36.3 °C)-98.6 °F (37 °C)] 97.9 °F (36.6 °C)  Heart Rate:  [] 96  Resp:  [15-17] 16  BP: (113-156)/(48-92) 142/67  Body mass index is 29.21 kg/m².    Objective:  General Appearance:  Comfortable and in no acute distress.    Vital signs: (most recent): Blood pressure 142/67, pulse 96, temperature 97.9 °F (36.6 °C), temperature source Oral, resp. rate 16, height 182.9 cm (72\"), weight 97.7 kg (215 lb 6.2 oz), SpO2 97%.    Lungs:  Normal effort and normal respiratory rate.  No decreased breath sounds.    Heart: Normal rate.  Regular rhythm.    Abdomen: Abdomen is soft.  Bowel sounds are normal.   There is no abdominal tenderness.     Extremities: There is no dependent edema or local swelling.  (Swelling with some tenderness to the right knee.  No erythema or warmth)  Neurological: Patient is alert and oriented to person, place and time.    Skin:  Warm and dry.                Results Review:       I reviewed the patient's new clinical results.    Results from last 7 days   Lab Units 10/22/24  0257 10/21/24  0348 10/20/24  0538 10/19/24  0324 10/18/24  1152 10/18/24  0628 10/17/24  2357 10/17/24  1413 10/17/24  1059   WBC 10*3/mm3 10.78 11.50* 15.91* 17.46*  --   --  12.99*  --  17.53*   HEMOGLOBIN g/dL 11.9* 12.7* 12.6* 13.4 13.6 12.9* 12.5*   < > 13.8   PLATELETS 10*3/mm3 289 267 246 259  --   --  261  --  339    < > = values in this interval not displayed.     Results from last 7 days   Lab Units 10/22/24  0257 10/21/24  0348 10/20/24  0538 10/19/24  0324 10/17/24  6517 10/17/24  1059 "   SODIUM mmol/L 134* 132* 133* 134* 137 137   POTASSIUM mmol/L 3.8 3.8 4.2 3.9 4.5 4.2   CHLORIDE mmol/L 100 100 100 101 105 100   CO2 mmol/L 24.7 23.0 23.0 22.0 23.8 26.5   BUN mg/dL 15 16 12 15 29* 34*   CREATININE mg/dL 0.94 1.00 1.12 1.14 1.22 1.59*   CALCIUM mg/dL 8.9 8.5* 8.6 9.2 8.7 9.7   GLUCOSE mg/dL 123* 139* 156* 126* 94 170*     Results from last 7 days   Lab Units 10/17/24  1059   INR  1.08             Scheduled Meds:   atorvastatin, 40 mg, Oral, Nightly  colchicine, 1.2 mg, Oral, Once   Followed by  colchicine, 0.6 mg, Oral, Once   Followed by  colchicine, 0.6 mg, Oral, Once  insulin lispro, 2-7 Units, Subcutaneous, 4x Daily AC & at Bedtime  isosorbide mononitrate, 30 mg, Oral, QAM  lidocaine, 10 mL, Intradermal, Once  [Held by provider] lisinopril, 20 mg, Oral, Daily  melatonin, 2.5 mg, Oral, Nightly  methylPREDNISolone, 4 mg, Oral, After Lunch  methylPREDNISolone, 4 mg, Oral, After Dinner  [START ON 10/23/2024] methylPREDNISolone, 4 mg, Oral, TID Around Food  [START ON 10/24/2024] methylPREDNISolone, 4 mg, Oral, 4x Daily Taper  [START ON 10/25/2024] methylPREDNISolone, 4 mg, Oral, TID Around Food  [START ON 10/26/2024] methylPREDNISolone, 4 mg, Oral, Before Breakfast  [START ON 10/26/2024] methylPREDNISolone, 4 mg, Oral, Tonight  [START ON 10/27/2024] methylPREDNISolone, 4 mg, Oral, Before Breakfast  methylPREDNISolone, 8 mg, Oral, Tonight  [START ON 10/23/2024] methylPREDNISolone, 8 mg, Oral, Tonight  pantoprazole, 40 mg, Oral, Q AM  senna-docusate sodium, 2 tablet, Oral, BID   And  polyethylene glycol, 17 g, Oral, Daily      PRN Meds:     acetaminophen    senna-docusate sodium **AND** polyethylene glycol **AND** bisacodyl **AND** bisacodyl    dextrose    dextrose    dextrose    glucagon (human recombinant)    HYDROmorphone    labetalol    melatonin    nitroglycerin    ondansetron    ondansetron    sodium chloride    traMADol  Continuous Infusions:       Assessment & Plan   Active Hospital Problems     Diagnosis  POA    **Hematemesis [K92.0]  Yes    Abdominal pain [R10.9]  Yes    Generalized weakness [R53.1]  Yes    Superior mesenteric artery stenosis [K55.1]  Yes    ELVIN (acute kidney injury) [N17.9]  Yes    SIRS (systemic inflammatory response syndrome) [R65.10]  Yes    Lactic acidosis [E87.20]  Yes    Hypertension [I10]  Yes    Diabetes mellitus [E11.9]  Yes    Hyperlipidemia [E78.5]  Yes    Peripheral vascular disease [I73.9]  Yes    Carotid artery stenosis [I65.29]  Yes      Resolved Hospital Problems   No resolved problems to display.       Assessment & Plan    Brief Hospital Course to date:  Kevon Orantes is a 74 y.o. male presents to the hospital with hematemesis, abdominal pain, and general weakness and CT scan shows diffuse gastric wall thickening and high-grade stenosis of the superior mesenteric artery.     SIRS concern for infection/possible sepsis from abdominal source with lactic acidosis:  Initially on Zosyn and broad-spectrum coverage provided initially and no clear source identified.  Now improving off antibiotics.  He received IV fluid for SIRS.  Monitoring off fluids and antibiotics for now     Right knee effusion/pain and ankle pain:  Fluid studies consistent with acute gout.  Placed on colchicine.  I do worry little bit about placing him on systemic steroids given the recent hematemesis with gastritis and erosions found on EGD.  Reaching out to orthopedic surgery to see if they could still inject the knee instead.     Hematemesis: Resolved  Etiology likely gastritis which was noted on EGD, trend anemia.  PPI changed to oral.  Gastroenterology have signed off     Duodenal stricture: Status post dilation on EGD 10/18     Prerenal acute kidney injury:  Improved with IV fluid initially.  Trend renal function and renally dose medications.     SMA stenosis:  Evaluated by vascular surgery who feels patient does not have mesenteric ischemia.  Patient plans to follow-up with vascular surgery.      Diabetes: Monitor glucose and adjust insulin as needed.  A1c 6.2.     Essential hypertension: Monitor blood pressure and adjust medicines as needed.  Labetalol as needed for greater than 180.     Hyperlipidemia: Continue statin.  Stable.     DVT Prophylaxis: Mechanical        Disposition: Anticipate discharge home once he has further improvement of his acute gout      Expected Discharge Date: 10/23/2024; Expected Discharge Time:      Aidan Virk MD  [facility]  10/22/24  09:56 EDT
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
## 2024-10-22 NOTE — PLAN OF CARE
Goal Outcome Evaluation:  Plan of Care Reviewed With: patient        Progress: improving  Outcome Evaluation: Pt was seen for PT tx this AM. Pt was in bed and sat up to EOB w/ SV after pt educ on using of gt belt for self assist of R LE to EOB. Pt stood req max A x 2. Pt amb 1' to chair w/ min A / CGA x 2 and use of fww. Pt appeared to be fully wt bearing on R LE w/ gait slow and antalgic. Ther ex attempted for R LE w/ pt unable to perform 2/2 to pain. Pt educ on keep R knee in neutral positioning w/ ER noted. Pt also educ to not place pillow under R knee to encourage R knee extension w/ pt noted to be keeping in slight flexion at all times. PT will prog as pt rachel.    Anticipated Discharge Disposition (PT): skilled nursing facility, home with 24/7 care, home with home health

## 2024-10-23 ENCOUNTER — READMISSION MANAGEMENT (OUTPATIENT)
Dept: CALL CENTER | Facility: HOSPITAL | Age: 74
End: 2024-10-23
Payer: MEDICARE

## 2024-10-23 ENCOUNTER — HOME HEALTH ADMISSION (OUTPATIENT)
Dept: HOME HEALTH SERVICES | Facility: HOME HEALTHCARE | Age: 74
End: 2024-10-23
Payer: MEDICARE

## 2024-10-23 ENCOUNTER — DOCUMENTATION (OUTPATIENT)
Dept: HOME HEALTH SERVICES | Facility: HOME HEALTHCARE | Age: 74
End: 2024-10-23
Payer: MEDICARE

## 2024-10-23 VITALS
HEIGHT: 72 IN | HEART RATE: 82 BPM | BODY MASS INDEX: 28.49 KG/M2 | TEMPERATURE: 97.9 F | RESPIRATION RATE: 18 BRPM | WEIGHT: 210.32 LBS | DIASTOLIC BLOOD PRESSURE: 70 MMHG | SYSTOLIC BLOOD PRESSURE: 140 MMHG | OXYGEN SATURATION: 97 %

## 2024-10-23 LAB — GLUCOSE BLDC GLUCOMTR-MCNC: 106 MG/DL (ref 70–130)

## 2024-10-23 PROCEDURE — 82948 REAGENT STRIP/BLOOD GLUCOSE: CPT

## 2024-10-23 PROCEDURE — 97530 THERAPEUTIC ACTIVITIES: CPT

## 2024-10-23 RX ORDER — PANTOPRAZOLE SODIUM 40 MG/1
40 TABLET, DELAYED RELEASE ORAL
Qty: 30 TABLET | Refills: 0 | Status: SHIPPED | OUTPATIENT
Start: 2024-10-24 | End: 2024-11-23

## 2024-10-23 RX ADMIN — ISOSORBIDE MONONITRATE 30 MG: 30 TABLET, EXTENDED RELEASE ORAL at 06:21

## 2024-10-23 RX ADMIN — PANTOPRAZOLE SODIUM 40 MG: 40 TABLET, DELAYED RELEASE ORAL at 06:21

## 2024-10-23 NOTE — PLAN OF CARE
Problem: Adult Inpatient Plan of Care  Goal: Plan of Care Review  Outcome: Met  Flowsheets (Taken 10/23/2024 1131)  Progress: improving  Outcome Evaluation: Vitals stable. Worked with PT. Pain in right knee controlled. Pt discharged home with HH - Rx, instructions, and follow ups provided.  Plan of Care Reviewed With: patient

## 2024-10-23 NOTE — DISCHARGE PLACEMENT REQUEST
"Kevon Chiu (74 y.o. Male)       Date of Birth   1950    Social Security Number       Address   [address] [place] [address]    Home Phone   [phone]    MRN   [id number]       [facility]    Marital Status                               Admission Date   10/17/24    Admission Type   Emergency    Admitting Provider   Dave Block MD    Attending Provider   Aidan Virk MD    Department, Room/Bed   [address], [address]       Discharge Date       Discharge Disposition   Home or Self Care    Discharge Destination                                 Attending Provider: Aidan Virk MD    Allergies: No Known Allergies    Isolation: None   Infection: None   Code Status: CPR    Ht: 182.9 cm (72\")   Wt: 95.4 kg (210 lb 5.1 oz)    Admission Cmt: None   Principal Problem: Hematemesis [K92.0]                   Active Insurance as of 10/17/2024       Primary Coverage       Payor Plan Insurance Group Employer/Plan Group    MEDICARE MEDICARE A & B        Payor Plan Address Payor Plan Phone Number Payor Plan Fax Number Effective Dates    [address] [phone]  10/1/2014 - None Entered    [place] [id number]         Subscriber Name Subscriber Birth Date Member ID       KEVON CHIU 1950 [id number]                     Emergency Contacts        (Rel.) Home Phone Work Phone Mobile Phone    Negar Chiu (Spouse) [phone] -- [phone]    owens,april (Daughter) [phone] -- [phone]                "

## 2024-10-23 NOTE — PLAN OF CARE
Goal Outcome Evaluation:   Claimed he is much better after steroid administration thru his knee. Did not ask for pain medicine the whole nights. VS stable ,able to sleep . Desires to go home. Continue to monitor.

## 2024-10-23 NOTE — PROGRESS NOTES
[facility] will follow post hospital as requested. Patient/spouse agreeable to service. Contact information and PCP confirmed. Best for home health to call spouse at [phone] to schedule home visits.

## 2024-10-23 NOTE — DISCHARGE SUMMARY
Date of Admission: 10/17/2024  Date of Discharge:  10/23/2024  Primary Care Physician: Eisenmenger, Taylor, DO     Discharge Diagnosis:  Active Hospital Problems    Diagnosis  POA    **Hematemesis [K92.0]  Yes    Abdominal pain [R10.9]  Yes    Generalized weakness [R53.1]  Yes    Superior mesenteric artery stenosis [K55.1]  Yes    ELVIN (acute kidney injury) [N17.9]  Yes    SIRS (systemic inflammatory response syndrome) [R65.10]  Yes    Lactic acidosis [E87.20]  Yes    Hypertension [I10]  Yes    Diabetes mellitus [E11.9]  Yes    Hyperlipidemia [E78.5]  Yes    Peripheral vascular disease [I73.9]  Yes    Carotid artery stenosis [I65.29]  Yes      Resolved Hospital Problems   No resolved problems to display.       DETAILS OF HOSPITAL STAY     Pertinent Test Results and Procedures Performed    CT scan of the abdomen and pelvis:  1. Mild diffuse gastric wall thickening could be accentuated by   underdistention but could reflect a nonspecific gastritis. Consider   correlating with endoscopy if clinically indicated.   2. Calcified and noncalcified atherosclerotic disease in the proximal   superior mesenteric artery, resulting in high-grade stenosis.   3. Colonic diverticula, without CT evidence of diverticulitis.     CT angiogram of the abdomen and pelvis:  No evidence of bowel ischemia. The patient does have a severe stenosis   involving the proximal superior mesenteric artery. However, the celiac   axis appears widely patent, as is the inferior mesenteric artery,   although there is a moderate narrowing of the proximal inferior   mesenteric artery.     Hospital Course  This is a 74-year-old male who initially presented with hematemesis and was found to have gastritis and duodenal stricture on EGD which was dilated.  Please see H&P for full details.  His upper GI bleeding subsided.  This was felt to be due to the gastritis and gastric erosions.  GI recommends PPI and supportive care.  He was also found to have SMA  stenosis on imaging for which vascular surgery was consulted.  They did not feel that he had any mesenteric ischemia and he will follow-up with them in the outpatient setting.  Upon presentation there was also concern for SIRS for which she was initially placed on broad-spectrum antibiotics but after further evaluation there was no infection found and antibiotics were discontinued.  While he was here he developed severe right knee and ankle pain.  His right knee became swollen.  Orthopedic surgery was consulted and arthrocentesis was performed which was consistent with acute gout.  No evidence of septic arthritis was identified.  He was treated with colchicine and his right knee was injected with steroids with significant improvement today.  He now has significantly improved range of motion and can bear weight.  At this point he is medically stable and will be discharged back home.    Physical Exam at Discharge:  General: No acute distress, AAOx3  HEENT: EOMI, PERRL  Cardiovascular: +s1 and s2, RRR  Lungs: No rhonchi or wheezing  Abdomen: soft, nontender    Consults:   Consults       Date and Time Order Name Status Description    10/19/2024  4:43 PM Inpatient Orthopedic Surgery Consult Completed     10/17/2024  8:51 PM Inpatient Vascular Surgery Consult Completed     10/17/2024  5:31 PM Inpatient General Surgery Consult Completed     10/17/2024  1:49 PM Gastroenterology Consult Completed               Condition on Discharge: Stable, improved    Discharge Disposition  Home or Self Care    Discharge Medications     Discharge Medications        New Medications        Instructions Start Date   pantoprazole 40 MG EC tablet  Commonly known as: PROTONIX   40 mg, Oral, Every Early Morning   Start Date: October 24, 2024            Changes to Medications        Instructions Start Date   aspirin 81 MG EC tablet  What changed: when to take this   81 mg, Oral, Daily             Continue These Medications        Instructions  Start Date   allopurinol 300 MG tablet  Commonly known as: ZYLOPRIM   300 mg, Daily      atorvastatin 40 MG tablet  Commonly known as: LIPITOR   40 mg, Nightly      colchicine 0.6 MG tablet   0.6 mg, As Needed      glimepiride 2 MG tablet  Commonly known as: AMARYL   2 mg, Every Morning Before Breakfast      isosorbide mononitrate 30 MG 24 hr tablet  Commonly known as: IMDUR   30 mg, Every Morning      lisinopril 20 MG tablet  Commonly known as: PRINIVIL,ZESTRIL   20 mg, Daily      metFORMIN 1000 MG tablet  Commonly known as: GLUCOPHAGE   1,000 mg, 2 Times Daily               Discharge Diet:   Diet Instructions       Diet: Regular/House Diet, Diabetic Diets; Consistent Carbohydrate; Regular (IDDSI 7); Thin (IDDSI 0)      Discharge Diet:  Regular/House Diet  Diabetic Diets       Diabetic Diet: Consistent Carbohydrate    Texture: Regular (IDDSI 7)    Fluid Consistency: Thin (IDDSI 0)            Activity at Discharge:   Activity Instructions       Activity as Tolerated              Follow-up Appointments  Future Appointments   Date Time Provider Department Center   11/7/2024  1:40 PM Rashid Lucas MD [id number] None   4/23/2025 11:30 AM Sue Edmond MD [facility]     Additional Instructions for the Follow-ups that You Need to Schedule       Discharge Follow-up with PCP   As directed       Currently Documented PCP:    Eisenmenger, Taylor, DO    PCP Phone Number:    [phone]     Follow Up Details: 1 week                Test Results Pending at Discharge  Pending Labs       Order Current Status    Anaerobic Culture - Aspirate, Knee, Right In process    Body Fluid Culture - Aspirate, Knee, Right Preliminary result            I have examined and discussed discharge planning with the patient today.    Aidan Virk MD  10/23/24  09:38 EDT    Time: Discharge greater than 30 min

## 2024-10-23 NOTE — PLAN OF CARE
Goal Outcome Evaluation:  Plan of Care Reviewed With: patient        Progress: improving  Outcome Evaluation: Pt was seen by PT this AM for tx. Pt was in bed and sat up to EOB w/ SBA and pt assisting R LE slightly to move laterally to L side of bed. pt stood w/ mod A and incr time as well as bed elevated. Pt amb approx 40' w/ CGA and use of fww which was a large improvement in activity tolerance. Pt was wheeled in recliner to stairs and amb approx 15' to/from steps req CGA for sit to stand from chair. Pt performed 4 steps w/ step to step pattern and pt holding 1 HR w/ B hands as well as proved CGA. Pt was slow and antalgic for gait and steps although no overt LOB. Pt was UIC at end of session. Pt likely to dc to home today w/ assist of spouse. Rec HH PT and order of fww.    Anticipated Discharge Disposition (PT): home with home health, home with assist
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
## 2024-10-23 NOTE — CASE MANAGEMENT/SOCIAL WORK
Case Management Discharge Note      Final Note: Home with wife and [facility]. [employer] delivered walker to bedside. Wife transport         Selected Continued Care - Discharged on 10/23/2024 Admission date: 10/17/2024 - Discharge disposition: Home or Self Care      Destination    No services have been selected for the patient.                Durable Medical Equipment    No services have been selected for the patient.                Dialysis/Infusion    No services have been selected for the patient.                Home Medical Care Coordination complete.      Service Provider Services Address Phone Fax Patient Preferred     [facility] [address] [address] [phone] --              Therapy    No services have been selected for the patient.                Community Resources    No services have been selected for the patient.                Community & DME    No services have been selected for the patient.                    Transportation Services  Private: Car    Final Discharge Disposition Code: 06 - home with home health care

## 2024-10-23 NOTE — CASE MANAGEMENT/SOCIAL WORK
Continued Stay Note  [facility]     Patient Name: Kevon Orantes  MRN: [id number]  Today's Date: 10/23/2024    Admit Date: 10/17/2024    Plan: Home with wife and [facility]. [employer] to deliver walker to bedside. Wife transporting   Discharge Plan       Row Name 10/23/24 1009       Plan    Plan Home with wife and [facility]. [employer] to deliver walker to bedside. Wife transporting    Patient/Family in Agreement with Plan yes    Plan Comments Met with pt and wife at bedside to discuss D/C planning. PT working in room with pt. Pt has had much improvement with ambulation today. Wife states she feels she can care for pt at home. Discussed need for HH at D/C. Pt chose [facility]. Discussed pt needs walker for home and DME suppliers. Pt chose [employer] for walker. Orders for [facility] and Walker in [facility] and referrals placed. Per Jessica/[facility], [facility] can accept. Called [employer] and she will deliver walker to room, informed pt is D/C today. Pio Colunga RN-BC                   Discharge Codes    No documentation.                 Expected Discharge Date and Time       Expected Discharge Date Expected Discharge Time    Oct 23, 2024               Pio Colunga RN

## 2024-10-23 NOTE — OUTREACH NOTE
Prep Survey      Flowsheet Row Responses   [facility] facility patient discharged from? [place]   Is LACE score < 7 ? No   Eligibility Readm Mgmt   Discharge diagnosis Hematemesis   Does the patient have one of the following disease processes/diagnoses(primary or secondary)? Other   Does the patient have Home health ordered? Yes   What is the Home health agency?  [facility]   Is there a DME ordered? Yes   What DME was ordered? [employer] delivered walker to bedside   Prep survey completed? Yes            Marichuy GUNTER - Registered Nurse

## 2024-10-24 ENCOUNTER — HOME CARE VISIT (OUTPATIENT)
Dept: HOME HEALTH SERVICES | Facility: HOME HEALTHCARE | Age: 74
End: 2024-10-24
Payer: MEDICARE

## 2024-10-24 VITALS — OXYGEN SATURATION: 94 % | HEART RATE: 107 BPM

## 2024-10-24 PROCEDURE — G0151 HHCP-SERV OF PT,EA 15 MIN: HCPCS

## 2024-10-25 ENCOUNTER — HOME CARE VISIT (OUTPATIENT)
Dept: HOME HEALTH SERVICES | Facility: HOME HEALTHCARE | Age: 74
End: 2024-10-25
Payer: MEDICARE

## 2024-10-25 NOTE — CASE COMMUNICATION
please route to attending MD.     SOC OASIS completed on 10/24/2024.   Pt is a 75yo M recently hospitalized from 10/17 to 10/23 secondary to hematemesis, abdominal pian, weakness, superior mesenteric artery stenosis, ELVIN, SIRS, lactic acidosis, HTN, DM, HLD, PVD, CAD.  He underwent an EGD for upper GI bleed. He was also found to have R knee gout and an arthrocentesis was performed and also injected with steroids.  His knee ROM improved  and he can tolerate WB now.    FOC: R KNEE GOUT  Skilled PT necessary to increase pt safety with transfers, ambulation, instruct in R LE pain management, and decrease his risk for falls.  PLOF: working at [employer] 30 hours per week, living with wife in a 1 story home with steps to enter, driving, no AD.    Pt is currently relying on a walker, exhibits antalgic gait, and very wide [place] with transfers.  He tolerated seated exercises well tod ay.  Complete med review and all meds in the home.  His wife is a retired nurse.    Plan for PT 1wk1, 2wk2.   Plan for next visit: transfer training, gait training, progress HEP as tolerated.

## 2024-10-25 NOTE — HOME HEALTH
Pt is a 75yo M recently hospitalized from 10/17 to 10/23 secondary to hematemesis, abdominal pian, weakness, superior mesenteric artery stenosis, ELVIN, SIRS, lactic acidosis, HTN, DM, HLD, PVD, CAD.  He underwent an EGD for upper GI bleed. He was also found to have R knee gout and an arthrocentesis was performed and also injected with steroids.  His knee ROM improved and he can tolerate WB now.    FOC: R KNEE GOUT  Skilled PT necessary to increase pt safety with transfers, ambulation, instruct in R LE pain management, and decrease his risk for falls.  PLOF: working at [employer] 30 hours per week, living with wife in a 1 story home with steps to enter, driving, no AD.    Pt is currently relying on a walker, exhibits antalgic gait, and very wide [place] with transfers.  He tolerated seated exercises well today.  Complete med review and all meds in the home.  His wife is a retired nurse.    Plan for PT 1wk1, 2wk2.   Plan for next visit: transfer training, gait training, progress HEP as tolerated.

## 2024-10-26 LAB
BACTERIA FLD CULT: NORMAL
BACTERIA SPEC ANAEROBE CULT: NORMAL
GRAM STN SPEC: NORMAL
GRAM STN SPEC: NORMAL

## 2024-10-28 ENCOUNTER — READMISSION MANAGEMENT (OUTPATIENT)
Dept: CALL CENTER | Facility: HOSPITAL | Age: 74
End: 2024-10-28
Payer: MEDICARE

## 2024-10-28 NOTE — OUTREACH NOTE
Medical Week 1 Survey      Flowsheet Row Responses   [facility] patient discharged from? [place]   Does the patient have one of the following disease processes/diagnoses(primary or secondary)? Other   Week 1 attempt successful? Yes   Call start time 1357   Call end time 1403   Discharge diagnosis Hematemesis   Person spoke with today (if not patient) and relationship Negar, wife   Meds reviewed with patient/caregiver? Yes   Is the patient having any side effects they believe may be caused by any medication additions or changes? No   Does the patient have all medications ordered at discharge? Yes   Is the patient taking all medications as directed (includes completed medication regime)? Yes   Does the patient have a primary care provider?  Yes   Does the patient have an appointment with their PCP within 7 days of discharge? Yes   Has the patient kept scheduled appointments due by today? N/A   What is the Home health agency?  [facility]   Has home health visited the patient within 72 hours of discharge? Yes   What DME was ordered? [employer] delivered walker to bedside   Has all DME been delivered? Yes   Psychosocial issues? No   Did the patient receive a copy of their discharge instructions? Yes   Nursing interventions Reviewed instructions with patient   What is the patient's perception of their health status since discharge? Improving   Is the patient/caregiver able to teach back signs and symptoms related to disease process for when to call PCP? Yes   Is the patient/caregiver able to teach back signs and symptoms related to disease process for when to call 911? Yes   Is the patient/caregiver able to teach back the hierarchy of who to call/visit for symptoms/problems? PCP, Specialist, Home health nurse, Urgent Care, ED, 911 Yes   If the patient is a current smoker, are they able to teach back resources for cessation? Not a smoker   Additional teach back comments wife states activities much  increased with help of HH PT, no longer needs walker   Week 1 call completed? Yes   Call end time 1403            Penny INMAN - Registered Nurse

## 2024-10-29 ENCOUNTER — HOME CARE VISIT (OUTPATIENT)
Dept: HOME HEALTH SERVICES | Facility: HOME HEALTHCARE | Age: 74
End: 2024-10-29
Payer: MEDICARE

## 2024-10-29 VITALS
OXYGEN SATURATION: 97 % | SYSTOLIC BLOOD PRESSURE: 110 MMHG | DIASTOLIC BLOOD PRESSURE: 62 MMHG | TEMPERATURE: 97.1 F | HEART RATE: 90 BPM

## 2024-10-29 PROCEDURE — G0157 HHC PT ASSISTANT EA 15: HCPCS

## 2024-10-29 NOTE — HOME HEALTH
Subjective: I am doing good, I haven't felt like I needed the walker over the weekend    Falls- none  Medication Changes- none    Assessment: Patient states he has not used the walker or cane in 2 days and states he feels fine. He manuevered with no AD today with multiple turns and had no LOB and cued on upright posture and slow steady jaleel for safety.  He was able to review and progress HEP in seated and standing .Patient re-education on medication regimen, hydration and proper nutrition and used teach back for carryover and accuracy. Patient will benefit with continued PT for progression and reduce risk of decline.    Plan for next visit:  Gait training  Review and progress HEP  Balance/transfers/safety

## 2024-10-31 ENCOUNTER — HOME CARE VISIT (OUTPATIENT)
Dept: HOME HEALTH SERVICES | Facility: HOME HEALTHCARE | Age: 74
End: 2024-10-31
Payer: MEDICARE

## 2024-10-31 VITALS
SYSTOLIC BLOOD PRESSURE: 108 MMHG | HEART RATE: 93 BPM | DIASTOLIC BLOOD PRESSURE: 64 MMHG | TEMPERATURE: 96.9 F | OXYGEN SATURATION: 96 %

## 2024-10-31 PROCEDURE — G0157 HHC PT ASSISTANT EA 15: HCPCS

## 2024-10-31 NOTE — HOME HEALTH
"Subjective: Patient was walking down driveway to get the garbage can upon arrival. Patient  states \" I have some itching around my waist\" ( he is seeing PCP today- maybe shingles)    Falls- none   Medication Changes- none     Assessment: Patient was outside upon arrival moving a garbage can and demonstrated pulling it up the driveway and go up steps int the garage to get in the home.He manuevered with no AD out the front door with therapist and on street level with multiple turns and had no LOB and cued on upright posture and slow steady jaleel for safety. He was able to review and progress HEP in seated and standing and progressed balance assessments with no LOB. .Patient re-education on medication regimen, hydration and proper nutrition and used teach back for carryover and accuracy. Patient will benefit with continued PT for progression and reduce risk of decline.     Plan for next visit:   Gait training   Review and progress HEP   Balance  follow up with itching"

## 2024-11-05 ENCOUNTER — HOME CARE VISIT (OUTPATIENT)
Dept: HOME HEALTH SERVICES | Facility: HOME HEALTHCARE | Age: 74
End: 2024-11-05
Payer: MEDICARE

## 2024-11-05 VITALS
TEMPERATURE: 97.5 F | HEART RATE: 108 BPM | OXYGEN SATURATION: 96 % | DIASTOLIC BLOOD PRESSURE: 68 MMHG | SYSTOLIC BLOOD PRESSURE: 108 MMHG

## 2024-11-05 PROCEDURE — G0157 HHC PT ASSISTANT EA 15: HCPCS

## 2024-11-05 NOTE — HOME HEALTH
Subjective: I went outside down the street already. My itching went away    Falls- none   Medication Changes- none     Assessment: Patient manuevered with no AD out the front door with therapist and on street level with multiple turns and had no LOB and cued on upright posture and slow steady jaleel for safety. He is slower on the incline/decline of driveway and knowledgeable to use cane if needed but states he doesn't use it anymore. He was able to review and progress HEP in seated and standing and progressed balance assessments with no LOB. .Patient re-education on medication regimen, hydration and proper nutrition and used teach back for carryover and accuracy. Patient will be discharged next visit with PT Kayley if appropriate      Plan for next visit:   Gait training   Review  HEP   Balance

## 2024-11-06 ENCOUNTER — TELEPHONE (OUTPATIENT)
Dept: GASTROENTEROLOGY | Facility: CLINIC | Age: 74
End: 2024-11-06
Payer: MEDICARE

## 2024-11-06 ENCOUNTER — READMISSION MANAGEMENT (OUTPATIENT)
Dept: CALL CENTER | Facility: HOSPITAL | Age: 74
End: 2024-11-06
Payer: MEDICARE

## 2024-11-06 NOTE — TELEPHONE ENCOUNTER
----- Message from Matthias Schulte sent at 10/29/2024  3:14 PM EDT -----  Reviewed  No action needed

## 2024-11-06 NOTE — OUTREACH NOTE
Medical Week 2 Survey      Flowsheet Row Responses   [facility] patient discharged from? [place]   Does the patient have one of the following disease processes/diagnoses(primary or secondary)? Other   Week 2 attempt successful? Yes   Call start time 1512   Discharge diagnosis Hematemesis   Call end time 1513   Person spoke with today (if not patient) and relationship Negar, wife   Meds reviewed with patient/caregiver? Yes   Is the patient taking all medications as directed (includes completed medication regime)? Yes   Does the patient have a primary care provider?  Yes   Does the patient have an appointment with their PCP within 7 days of discharge? Yes   Has the patient kept scheduled appointments due by today? Yes   What is the Home health agency?  [facility]   Has home health visited the patient within 72 hours of discharge? Yes   Psychosocial issues? No   Did the patient receive a copy of their discharge instructions? Yes   Nursing interventions Reviewed instructions with patient   What is the patient's perception of their health status since discharge? Improving   Is the patient/caregiver able to teach back signs and symptoms related to disease process for when to call PCP? Yes   Is the patient/caregiver able to teach back signs and symptoms related to disease process for when to call 911? Yes   Is the patient/caregiver able to teach back the hierarchy of who to call/visit for symptoms/problems? PCP, Specialist, Home health nurse, Urgent Care, ED, 911 Yes   Week 2 Call Completed? Yes   Graduated Yes   Graduated/Revoked comments Wife reports Pt is doing well.   Call end time 1513            NAYAN BENDER - Registered Nurse

## 2024-11-07 ENCOUNTER — OFFICE VISIT (OUTPATIENT)
Age: 74
End: 2024-11-07
Payer: MEDICARE

## 2024-11-07 ENCOUNTER — HOME CARE VISIT (OUTPATIENT)
Dept: HOME HEALTH SERVICES | Facility: HOME HEALTHCARE | Age: 74
End: 2024-11-07
Payer: MEDICARE

## 2024-11-07 VITALS
DIASTOLIC BLOOD PRESSURE: 70 MMHG | SYSTOLIC BLOOD PRESSURE: 110 MMHG | HEART RATE: 97 BPM | WEIGHT: 208 LBS | HEIGHT: 72 IN | BODY MASS INDEX: 28.17 KG/M2

## 2024-11-07 VITALS — SYSTOLIC BLOOD PRESSURE: 122 MMHG | DIASTOLIC BLOOD PRESSURE: 70 MMHG | OXYGEN SATURATION: 95 % | HEART RATE: 98 BPM

## 2024-11-07 DIAGNOSIS — I65.22 STENOSIS OF LEFT CAROTID ARTERY: ICD-10-CM

## 2024-11-07 DIAGNOSIS — I73.9 PERIPHERAL VASCULAR DISEASE: Primary | ICD-10-CM

## 2024-11-07 DIAGNOSIS — I10 PRIMARY HYPERTENSION: ICD-10-CM

## 2024-11-07 PROCEDURE — 3078F DIAST BP <80 MM HG: CPT | Performed by: INTERNAL MEDICINE

## 2024-11-07 PROCEDURE — 99214 OFFICE O/P EST MOD 30 MIN: CPT | Performed by: INTERNAL MEDICINE

## 2024-11-07 PROCEDURE — G0151 HHCP-SERV OF PT,EA 15 MIN: HCPCS

## 2024-11-07 PROCEDURE — 93000 ELECTROCARDIOGRAM COMPLETE: CPT | Performed by: INTERNAL MEDICINE

## 2024-11-07 PROCEDURE — 3074F SYST BP LT 130 MM HG: CPT | Performed by: INTERNAL MEDICINE

## 2024-11-07 NOTE — HOME HEALTH
Pt presented outside checking the mail.  Pt reports his urine is clear today.   No pain, no falls.   He has progressed very well and agreeable to PT DC.
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
## 2025-02-17 ENCOUNTER — OFFICE VISIT (OUTPATIENT)
Dept: INTERNAL MEDICINE | Age: 75
End: 2025-02-17
Payer: MEDICARE

## 2025-02-17 VITALS
DIASTOLIC BLOOD PRESSURE: 84 MMHG | HEIGHT: 72 IN | WEIGHT: 204 LBS | OXYGEN SATURATION: 96 % | BODY MASS INDEX: 27.63 KG/M2 | SYSTOLIC BLOOD PRESSURE: 142 MMHG | HEART RATE: 93 BPM | TEMPERATURE: 96.9 F

## 2025-02-17 DIAGNOSIS — I10 PRIMARY HYPERTENSION: ICD-10-CM

## 2025-02-17 DIAGNOSIS — I65.22 STENOSIS OF LEFT CAROTID ARTERY: Chronic | ICD-10-CM

## 2025-02-17 DIAGNOSIS — N18.31 TYPE 2 DIABETES MELLITUS WITH STAGE 3A CHRONIC KIDNEY DISEASE, WITHOUT LONG-TERM CURRENT USE OF INSULIN: Chronic | ICD-10-CM

## 2025-02-17 DIAGNOSIS — E11.22 TYPE 2 DIABETES MELLITUS WITH STAGE 3A CHRONIC KIDNEY DISEASE, WITHOUT LONG-TERM CURRENT USE OF INSULIN: Chronic | ICD-10-CM

## 2025-02-17 DIAGNOSIS — E78.00 PURE HYPERCHOLESTEROLEMIA: ICD-10-CM

## 2025-02-17 DIAGNOSIS — M1A.9XX0 CHRONIC GOUT WITHOUT TOPHUS, UNSPECIFIED CAUSE, UNSPECIFIED SITE: Primary | ICD-10-CM

## 2025-02-17 DIAGNOSIS — I25.10 CORONARY ARTERY DISEASE INVOLVING NATIVE CORONARY ARTERY OF NATIVE HEART WITHOUT ANGINA PECTORIS: Chronic | ICD-10-CM

## 2025-02-17 PROBLEM — N17.9 AKI (ACUTE KIDNEY INJURY): Status: RESOLVED | Noted: 2024-10-18 | Resolved: 2025-02-17

## 2025-02-17 PROBLEM — E11.9 DIABETES MELLITUS: Chronic | Status: ACTIVE | Noted: 2019-10-29

## 2025-02-17 PROBLEM — R10.9 ABDOMINAL PAIN: Status: RESOLVED | Noted: 2024-10-18 | Resolved: 2025-02-17

## 2025-02-17 PROBLEM — K92.0 HEMATEMESIS: Status: RESOLVED | Noted: 2024-10-17 | Resolved: 2025-02-17

## 2025-02-17 PROBLEM — R65.10 SIRS (SYSTEMIC INFLAMMATORY RESPONSE SYNDROME): Status: RESOLVED | Noted: 2024-10-18 | Resolved: 2025-02-17

## 2025-02-17 PROBLEM — I65.29 CAROTID ARTERY STENOSIS: Chronic | Status: ACTIVE | Noted: 2019-10-10

## 2025-02-17 PROBLEM — R35.1 NOCTURIA: Status: RESOLVED | Noted: 2019-01-09 | Resolved: 2025-02-17

## 2025-02-17 PROBLEM — E78.5 HYPERLIPIDEMIA: Chronic | Status: ACTIVE | Noted: 2019-10-29

## 2025-02-17 PROBLEM — I73.9 PERIPHERAL VASCULAR DISEASE: Chronic | Status: ACTIVE | Noted: 2019-10-10

## 2025-02-17 PROBLEM — Z78.9 NON-SMOKER: Status: RESOLVED | Noted: 2017-05-01 | Resolved: 2025-02-17

## 2025-02-17 PROBLEM — E87.20 LACTIC ACIDOSIS: Status: RESOLVED | Noted: 2024-10-18 | Resolved: 2025-02-17

## 2025-02-17 PROBLEM — M54.2 NECK PAIN: Status: RESOLVED | Noted: 2019-03-19 | Resolved: 2025-02-17

## 2025-02-17 PROBLEM — R53.1 GENERALIZED WEAKNESS: Status: RESOLVED | Noted: 2024-10-18 | Resolved: 2025-02-17

## 2025-02-17 LAB
ALBUMIN SERPL-MCNC: 4.3 G/DL (ref 3.5–5.2)
ALBUMIN/GLOB SERPL: 1.8 G/DL
ALP SERPL-CCNC: 96 U/L (ref 39–117)
ALT SERPL-CCNC: 17 U/L (ref 1–41)
AST SERPL-CCNC: 15 U/L (ref 1–40)
BASOPHILS # BLD AUTO: 0.08 10*3/MM3 (ref 0–0.2)
BASOPHILS NFR BLD AUTO: 0.9 % (ref 0–1.5)
BILIRUB SERPL-MCNC: 0.5 MG/DL (ref 0–1.2)
BUN SERPL-MCNC: 22 MG/DL (ref 8–23)
BUN/CREAT SERPL: 19 (ref 7–25)
CALCIUM SERPL-MCNC: 9.6 MG/DL (ref 8.6–10.5)
CHLORIDE SERPL-SCNC: 102 MMOL/L (ref 98–107)
CHOLEST SERPL-MCNC: 118 MG/DL (ref 0–200)
CHOLEST/HDLC SERPL: 1.9 {RATIO}
CO2 SERPL-SCNC: 24.8 MMOL/L (ref 22–29)
CREAT SERPL-MCNC: 1.16 MG/DL (ref 0.76–1.27)
EGFRCR SERPLBLD CKD-EPI 2021: 65.7 ML/MIN/1.73
EOSINOPHIL # BLD AUTO: 1.19 10*3/MM3 (ref 0–0.4)
EOSINOPHIL NFR BLD AUTO: 12.8 % (ref 0.3–6.2)
ERYTHROCYTE [DISTWIDTH] IN BLOOD BY AUTOMATED COUNT: 12.7 % (ref 12.3–15.4)
GLOBULIN SER CALC-MCNC: 2.4 GM/DL
GLUCOSE SERPL-MCNC: 87 MG/DL (ref 65–99)
HBA1C MFR BLD: 5.5 % (ref 4.8–5.6)
HCT VFR BLD AUTO: 43.5 % (ref 37.5–51)
HDLC SERPL-MCNC: 62 MG/DL (ref 40–60)
HGB BLD-MCNC: 14.5 G/DL (ref 13–17.7)
IMM GRANULOCYTES # BLD AUTO: 0.03 10*3/MM3 (ref 0–0.05)
IMM GRANULOCYTES NFR BLD AUTO: 0.3 % (ref 0–0.5)
LDLC SERPL CALC-MCNC: 40 MG/DL (ref 0–100)
LYMPHOCYTES # BLD AUTO: 1.81 10*3/MM3 (ref 0.7–3.1)
LYMPHOCYTES NFR BLD AUTO: 19.5 % (ref 19.6–45.3)
MCH RBC QN AUTO: 31.2 PG (ref 26.6–33)
MCHC RBC AUTO-ENTMCNC: 33.3 G/DL (ref 31.5–35.7)
MCV RBC AUTO: 93.5 FL (ref 79–97)
MONOCYTES # BLD AUTO: 0.8 10*3/MM3 (ref 0.1–0.9)
MONOCYTES NFR BLD AUTO: 8.6 % (ref 5–12)
NEUTROPHILS # BLD AUTO: 5.39 10*3/MM3 (ref 1.7–7)
NEUTROPHILS NFR BLD AUTO: 57.9 % (ref 42.7–76)
NRBC BLD AUTO-RTO: 0 /100 WBC (ref 0–0.2)
PLATELET # BLD AUTO: 227 10*3/MM3 (ref 140–450)
POTASSIUM SERPL-SCNC: 4.4 MMOL/L (ref 3.5–5.2)
PROT SERPL-MCNC: 6.7 G/DL (ref 6–8.5)
RBC # BLD AUTO: 4.65 10*6/MM3 (ref 4.14–5.8)
SODIUM SERPL-SCNC: 138 MMOL/L (ref 136–145)
TRIGL SERPL-MCNC: 83 MG/DL (ref 0–150)
UNABLE TO VOID: NORMAL
URATE SERPL-MCNC: 8.1 MG/DL (ref 3.4–7)
VLDLC SERPL CALC-MCNC: 16 MG/DL (ref 5–40)
WBC # BLD AUTO: 9.3 10*3/MM3 (ref 3.4–10.8)

## 2025-02-17 PROCEDURE — 3077F SYST BP >= 140 MM HG: CPT | Performed by: INTERNAL MEDICINE

## 2025-02-17 PROCEDURE — 3079F DIAST BP 80-89 MM HG: CPT | Performed by: INTERNAL MEDICINE

## 2025-02-17 PROCEDURE — 99204 OFFICE O/P NEW MOD 45 MIN: CPT | Performed by: INTERNAL MEDICINE

## 2025-02-17 PROCEDURE — G2211 COMPLEX E/M VISIT ADD ON: HCPCS | Performed by: INTERNAL MEDICINE

## 2025-02-17 PROCEDURE — 1159F MED LIST DOCD IN RCRD: CPT | Performed by: INTERNAL MEDICINE

## 2025-02-17 PROCEDURE — 1160F RVW MEDS BY RX/DR IN RCRD: CPT | Performed by: INTERNAL MEDICINE

## 2025-02-17 RX ORDER — GLIMEPIRIDE 2 MG/1
2 TABLET ORAL
COMMUNITY
Start: 2025-02-17 | End: 2025-02-19 | Stop reason: SDUPTHER

## 2025-02-17 RX ORDER — ASPIRIN 81 MG/1
81 TABLET ORAL DAILY
COMMUNITY
Start: 2025-02-17

## 2025-02-17 RX ORDER — COLCHICINE 0.6 MG/1
0.6 TABLET ORAL DAILY PRN
Qty: 90 TABLET | Refills: 0 | Status: SHIPPED | OUTPATIENT
Start: 2025-02-17

## 2025-02-17 RX ORDER — ATORVASTATIN CALCIUM 40 MG/1
40 TABLET, FILM COATED ORAL NIGHTLY
COMMUNITY
Start: 2025-02-17

## 2025-02-17 RX ORDER — LISINOPRIL 20 MG/1
20 TABLET ORAL DAILY
COMMUNITY
Start: 2025-02-17

## 2025-02-17 NOTE — ASSESSMENT & PLAN NOTE
BP Readings from Last 3 Encounters:   02/17/25 142/84   11/07/24 110/70   11/07/24 122/70       Marginal blood pressure today but this is the first visit with me.  Continue lisinopril 20 mg daily.  He is also on isosorbide mononitrate 30 mg daily for CAD.

## 2025-02-17 NOTE — ASSESSMENT & PLAN NOTE
Lab Results   Component Value Date    LDL 36.4 04/22/2024    LDL 25 07/25/2023    LDL 38.6 01/18/2023    TRIG 128 07/25/2023    CHOLHDLRATIO 2.1 04/22/2024       Continue atorvastatin 40 mg daily.  Check labs today.

## 2025-02-17 NOTE — ASSESSMENT & PLAN NOTE
At this time continue colchicine 0.6 mg every other day.  His gout has remained stable on this regimen for multiple months.  In the past his gout became more active when he was started on allopurinol but at that time he was not taking colchicine.  Maintain low purine diet.  Check uric acid level and if uric acid level is significantly elevated we will likely need to retry allopurinol while being maintained on colchicine during the bridge.

## 2025-02-17 NOTE — ASSESSMENT & PLAN NOTE
Check labs today. At this time continue current regimen including glimepiride 2 mg in the morning and metformin 1000 mg twice daily.  Referral for diabetic eye exam.

## 2025-02-17 NOTE — PROGRESS NOTES
J  U  N  O  H    K  I  M ,   M  D       I  N  T  E  R  N  A  L    M  E  D  I  C  I  N  E         ENCOUNTER DATE:  02/17/2025    Kevon Orantes / 74 y.o. / male    OFFICE VISIT ENCOUNTER       CHIEF COMPLAINT / REASON FOR OFFICE VISIT     Establish Care, Hypertension, Diabetes, Gout, and Coronary Artery Disease      ASSESSMENT & PLAN     Problem List Items Addressed This Visit          High    Type 2 diabetes mellitus with stage 3a chronic kidney disease, without long-term current use of insulin (Chronic)    Current Assessment & Plan     Check labs today. At this time continue current regimen including glimepiride 2 mg in the morning and metformin 1000 mg twice daily.  Referral for diabetic eye exam.         Relevant Medications    metFORMIN (GLUCOPHAGE) 1000 MG tablet    lisinopril (PRINIVIL,ZESTRIL) 20 MG tablet    glimepiride (AMARYL) 2 MG tablet    Other Relevant Orders    Ambulatory Referral to Ophthalmology (Completed)    Comprehensive Metabolic Panel    Hemoglobin A1c    Microalbumin / Creatinine Urine Ratio - Urine, Clean Catch    CBC & Differential    Gout - Primary (Chronic)    Current Assessment & Plan     At this time continue colchicine 0.6 mg every other day.  His gout has remained stable on this regimen for multiple months.  In the past his gout became more active when he was started on allopurinol but at that time he was not taking colchicine.  Maintain low purine diet.  Check uric acid level and if uric acid level is significantly elevated we will likely need to retry allopurinol while being maintained on colchicine during the bridge.         Relevant Medications    colchicine 0.6 MG tablet    Other Relevant Orders    Uric Acid       Medium    Hypertension (Chronic)    Current Assessment & Plan     BP Readings from Last 3 Encounters:   02/17/25 142/84   11/07/24 110/70   11/07/24 122/70       Marginal blood pressure today but this is the first visit with me.  Continue lisinopril 20 mg daily.   He is also on isosorbide mononitrate 30 mg daily for CAD.         Relevant Medications    lisinopril (PRINIVIL,ZESTRIL) 20 MG tablet    CAD (coronary artery disease) (Chronic)    Overview     Sees cardiology annually         Current Assessment & Plan     Remains clinically stable.  Continue aspirin 81 mg daily along with atorvastatin 40 mg daily.  Continue follow-up with cardiology regularly.         Relevant Medications    isosorbide mononitrate (IMDUR) 30 MG 24 hr tablet    aspirin 81 MG EC tablet    atorvastatin (LIPITOR) 40 MG tablet    Hyperlipidemia (Chronic)    Current Assessment & Plan     Lab Results   Component Value Date    LDL 36.4 04/22/2024    LDL 25 07/25/2023    LDL 38.6 01/18/2023    TRIG 128 07/25/2023    CHOLHDLRATIO 2.1 04/22/2024       Continue atorvastatin 40 mg daily.  Check labs today.         Relevant Medications    atorvastatin (LIPITOR) 40 MG tablet    Other Relevant Orders    Lipid Panel With / Chol / HDL Ratio       Unprioritized    Carotid artery stenosis (Chronic)    Relevant Medications    aspirin 81 MG EC tablet    atorvastatin (LIPITOR) 40 MG tablet     Orders Placed This Encounter   Procedures   • Comprehensive Metabolic Panel     Order Specific Question:   Release to patient     Answer:   Routine Release [[id number]]   • Lipid Panel With / Chol / HDL Ratio     Order Specific Question:   Release to patient     Answer:   Routine Release [[id number]]   • Hemoglobin A1c     Order Specific Question:   Release to patient     Answer:   Routine Release [[id number]]   • Microalbumin / Creatinine Urine Ratio - Urine, Clean Catch     Order Specific Question:   Release to patient     Answer:   Routine Release [[id number]]   • Uric Acid     Order Specific Question:   Release to patient     Answer:   Routine Release [[id number]]   • Ambulatory Referral to Ophthalmology     Referral Priority:   Routine     Referral Type:   Consultation     Referral Reason:   Specialty Services Required      "Requested Specialty:   Ophthalmology     Number of Visits Requested:   1   • CBC & Differential     Order Specific Question:   Manual Differential     Answer:   No     Order Specific Question:   Release to patient     Answer:   Routine Release [[id number]]     New Medications Ordered This Visit   Medications   • colchicine 0.6 MG tablet     Sig: Take 1 tablet by mouth Daily As Needed for Muscle / Joint Pain (Gout). Indications: Gout     Dispense:  90 tablet     Refill:  0       SUMMARY/DISCUSSION        TOTAL TIME OF ENCOUNTER:        Next Appointment with me: 5/21/2025    Return in about 3 months (around 5/17/2025) for Reassess chronic medical problems.      VITAL SIGNS     Vitals:    02/17/25 0719   BP: 142/84   Pulse: 93   Temp: 96.9 °F (36.1 °C)   SpO2: 96%   Weight: 92.5 kg (204 lb)   Height: 182.9 cm (72\")       BP Readings from Last 3 Encounters:   02/17/25 142/84   11/07/24 110/70   11/07/24 122/70     Wt Readings from Last 3 Encounters:   02/17/25 92.5 kg (204 lb)   11/07/24 94.3 kg (208 lb)   10/23/24 95.4 kg (210 lb 5.1 oz)     Body mass index is 27.67 kg/m².    Blood pressure readings recorded on patient flowsheet:       No data to display                  MEDICATIONS AT THE TIME OF OFFICE VISIT     Current Outpatient Medications on File Prior to Visit   Medication Sig   • aspirin 81 MG EC tablet Take 1 tablet by mouth Daily. Indications: hx of CVA   • atorvastatin (LIPITOR) 40 MG tablet Take 1 tablet by mouth Every Night. Indications: High Amount of Fats in the Blood   • glimepiride (AMARYL) 2 MG tablet Take 1 tablet by mouth Every Morning Before Breakfast. Indications: Type 2 Diabetes   • isosorbide mononitrate (IMDUR) 30 MG 24 hr tablet Take 1 tablet by mouth Every Morning. Indications: CAD/PVD   • lisinopril (PRINIVIL,ZESTRIL) 20 MG tablet Take 1 tablet by mouth Daily. Indications: High Blood Pressure   • metFORMIN (GLUCOPHAGE) 1000 MG tablet Take 1 tablet by mouth 2 (Two) Times a Day. Indications: " Type 2 Diabetes       HISTORY OF PRESENT ILLNESS     Patient is a pleasant 74-year-old male here to establish care.  His wife Negar is also a patient at my office.  Patient states that he decided to switch primary care provider because of disagreement about management of his scalp disease.  He has currently been taking colchicine 0.6 mg approximately 3 days of the week without any gout flareups over the last 3 to 4 months.  Patient states that in the past when his doctor started him on allopurinol his gout was much more active.  However it is important to note that during the time he was on allopurinol he was not taking colchicine concomitantly.  Patient has general understanding of gout and importance of diet adherence.  He rarely drinks alcohol.  He has had history of type 2 diabetes for at least 8 to 10 years most recent outside A1c was around 6.4 on metformin 1000 mg twice daily and glimepiride 2 mg.  He denies symptoms of hypoglycemia.  He does not see an eye care specialist for diabetic eye exams.  He has history of retinal artery occlusion.  He also has coronary artery disease followed by his cardiologist annually.  He denies any recent history of chest pain, dyspnea on exertion or heart palpitation he takes aspirin 81 mg daily along with isosorbide mononitrate 30 mg daily and atorvastatin 40 mg daily.  Hypertension is managed by lisinopril 20 mg daily.  Blood pressure is marginally elevated today.      Patient Care Team:  Rustam Bailey MD as PCP - General (Internal Medicine)  Rashid Lucas MD as Consulting Physician (Cardiology)    REVIEW OF SYSTEMS     Review of Systems   Constitutional: Negative.    HENT: Negative.     Eyes: Negative.    Respiratory: Negative.     Cardiovascular: Negative.  Negative for chest pain and palpitations.   Gastrointestinal: Negative.    Endocrine: Negative.    Genitourinary: Negative.    Musculoskeletal: Negative.    Skin: Negative.    Allergic/Immunologic: Negative.     Neurological: Negative.    Hematological: Negative.    Psychiatric/Behavioral: Negative.            PHYSICAL EXAMINATION     Physical Exam  Constitutional:       General: He is not in acute distress.     Appearance: Normal appearance. He is overweight.   HENT:      Head: Atraumatic.   Eyes:      General: No scleral icterus.     Pupils: Pupils are equal, round, and reactive to light.   Neck:      Thyroid: No thyroid mass or thyromegaly.      Vascular: No carotid bruit or JVD.   Cardiovascular:      Rate and Rhythm: Normal rate and regular rhythm.      Heart sounds: Normal heart sounds.   Pulmonary:      Effort: Pulmonary effort is normal.      Breath sounds: Normal breath sounds.   Abdominal:      Palpations: Abdomen is soft. There is no hepatomegaly, mass or pulsatile mass.      Tenderness: There is no abdominal tenderness.   Musculoskeletal:         General: No swelling. Deformity: NO PODAGRA / TOPHI (FOOT).     Right lower leg: No edema.      Left lower leg: No edema.   Skin:     General: Skin is warm.      Coloration: Skin is not pale.      Nails: There is no clubbing.   Neurological:      Mental Status: He is alert.   Psychiatric:         Mood and Affect: Mood normal.         Speech: Speech normal.         Behavior: Behavior normal.         Thought Content: Thought content normal.         Judgment: Judgment normal.             REVIEWED DATA     Labs:     Outside labs from 10/31/2024: Uric acid 8.0 (6/23/2024 it was 4.8 while on allopurinol), PSA 1.21, creatinine 1.36  A1c from 10/31/2024 was 6.5 and 6/7/2024 was 6.6  LDL from 4/22/2024 was 36, HDL 52 and triglycerides 103    Lab Results   Component Value Date     (L) 10/22/2024    K 3.8 10/22/2024    CALCIUM 8.9 10/22/2024    AST 27 10/21/2024    ALT 28 10/21/2024    BUN 15 10/22/2024    CREATININE 0.94 10/22/2024    CREATININE 1.00 10/21/2024    CREATININE 1.12 10/20/2024       Lab Results   Component Value Date    LDL 36.4 04/22/2024    LDL 25  "07/25/2023    LDL 38.6 01/18/2023    HDL 52 04/22/2024    HDL 54 07/25/2023    TRIG 128 07/25/2023    TRIG 143 02/15/2022     Lab Results   Component Value Date    TSH 2.130 10/30/2019     Lab Results   Component Value Date    WBC 10.78 10/22/2024    HGB 11.9 (L) 10/22/2024     10/22/2024     No results found for: \"MALBCRERATIO\"        Imaging:               Medical Tests:               Summary of old records / correspondence / consultant report:             Request outside records:       "

## 2025-02-17 NOTE — ASSESSMENT & PLAN NOTE
Remains clinically stable.  Continue aspirin 81 mg daily along with atorvastatin 40 mg daily.  Continue follow-up with cardiology regularly.

## 2025-02-18 NOTE — PROGRESS NOTES
CALL PATIENT with results:    1.  Uric acid level is high at 8.1.    - As we discussed during the office visit, I would recommend starting allopurinol at low-dose of 100 mg daily to help lower the uric acid level.    - Importantly, continue Colcrys 0.6 mg but take it once daily (currently taking it every other day) to prevent short-term risk of gout flareup.  Continue this for 2 weeks and thereafter may reduce it back down to every other day.  But continue allopurinol 100 mg every day.    - Recheck uric acid level in 1 month.    - Call if there is any degree of gout flareup along the way.  -Be sure to follow above instructions explicitly.    2. A1c level for blood sugar average is stable. Looks great.     3. Cholesterol is overall stable.     4. Labs for kidney, liver and electrolytes are stable (i.e.  normal, stable, improved or without clinically significant worsening)     5. Complete blood cell counts are stable.

## 2025-02-19 DIAGNOSIS — E11.22 TYPE 2 DIABETES MELLITUS WITH STAGE 3A CHRONIC KIDNEY DISEASE, WITHOUT LONG-TERM CURRENT USE OF INSULIN: Chronic | ICD-10-CM

## 2025-02-19 DIAGNOSIS — N18.31 TYPE 2 DIABETES MELLITUS WITH STAGE 3A CHRONIC KIDNEY DISEASE, WITHOUT LONG-TERM CURRENT USE OF INSULIN: Chronic | ICD-10-CM

## 2025-02-19 RX ORDER — GLIMEPIRIDE 2 MG/1
2 TABLET ORAL
Qty: 90 TABLET | Refills: 1 | Status: SHIPPED | OUTPATIENT
Start: 2025-02-19

## 2025-03-02 DIAGNOSIS — I10 PRIMARY HYPERTENSION: ICD-10-CM

## 2025-03-02 DIAGNOSIS — M1A.9XX0 CHRONIC GOUT WITHOUT TOPHUS, UNSPECIFIED CAUSE, UNSPECIFIED SITE: Primary | ICD-10-CM

## 2025-03-02 DIAGNOSIS — N18.31 TYPE 2 DIABETES MELLITUS WITH STAGE 3A CHRONIC KIDNEY DISEASE, WITHOUT LONG-TERM CURRENT USE OF INSULIN: Chronic | ICD-10-CM

## 2025-03-02 DIAGNOSIS — E11.22 TYPE 2 DIABETES MELLITUS WITH STAGE 3A CHRONIC KIDNEY DISEASE, WITHOUT LONG-TERM CURRENT USE OF INSULIN: Chronic | ICD-10-CM

## 2025-03-03 RX ORDER — LISINOPRIL 20 MG/1
20 TABLET ORAL DAILY
Qty: 90 TABLET | Refills: 1 | Status: SHIPPED | OUTPATIENT
Start: 2025-03-03

## 2025-03-03 RX ORDER — ALLOPURINOL 100 MG/1
100 TABLET ORAL DAILY
Qty: 90 TABLET | Refills: 1 | Status: SHIPPED | OUTPATIENT
Start: 2025-03-03

## 2025-03-04 DIAGNOSIS — M1A.9XX0 CHRONIC GOUT WITHOUT TOPHUS, UNSPECIFIED CAUSE, UNSPECIFIED SITE: ICD-10-CM

## 2025-03-05 RX ORDER — COLCHICINE 0.6 MG/1
0.6 TABLET ORAL DAILY PRN
Qty: 90 TABLET | Refills: 0 | Status: SHIPPED | OUTPATIENT
Start: 2025-03-05

## 2025-03-31 DIAGNOSIS — M1A.9XX0 CHRONIC GOUT WITHOUT TOPHUS, UNSPECIFIED CAUSE, UNSPECIFIED SITE: ICD-10-CM

## 2025-04-01 LAB — URATE SERPL-MCNC: 5.4 MG/DL (ref 3.4–7)

## 2025-05-21 ENCOUNTER — OFFICE VISIT (OUTPATIENT)
Dept: INTERNAL MEDICINE | Age: 75
End: 2025-05-21
Payer: MEDICARE

## 2025-05-21 VITALS
HEART RATE: 78 BPM | SYSTOLIC BLOOD PRESSURE: 120 MMHG | BODY MASS INDEX: 26.82 KG/M2 | OXYGEN SATURATION: 98 % | WEIGHT: 198 LBS | DIASTOLIC BLOOD PRESSURE: 62 MMHG | TEMPERATURE: 96.9 F | HEIGHT: 72 IN

## 2025-05-21 DIAGNOSIS — M1A.00X0 IDIOPATHIC CHRONIC GOUT WITHOUT TOPHUS, UNSPECIFIED SITE: Primary | Chronic | ICD-10-CM

## 2025-05-21 DIAGNOSIS — E11.22 TYPE 2 DIABETES MELLITUS WITH STAGE 3A CHRONIC KIDNEY DISEASE, WITHOUT LONG-TERM CURRENT USE OF INSULIN: Chronic | ICD-10-CM

## 2025-05-21 DIAGNOSIS — I25.10 CORONARY ARTERY DISEASE INVOLVING NATIVE CORONARY ARTERY OF NATIVE HEART WITHOUT ANGINA PECTORIS: Chronic | ICD-10-CM

## 2025-05-21 DIAGNOSIS — E78.00 PURE HYPERCHOLESTEROLEMIA: Chronic | ICD-10-CM

## 2025-05-21 DIAGNOSIS — N18.31 TYPE 2 DIABETES MELLITUS WITH STAGE 3A CHRONIC KIDNEY DISEASE, WITHOUT LONG-TERM CURRENT USE OF INSULIN: Chronic | ICD-10-CM

## 2025-05-21 DIAGNOSIS — I10 PRIMARY HYPERTENSION: Chronic | ICD-10-CM

## 2025-05-21 NOTE — ASSESSMENT & PLAN NOTE
Doing well from gout standpoint and uric acid was down to 5.4.   Continue allopurinol 100 mg daily  May decrease colchicine to once weekly for a few weeks then take it only as needed. Discussed.     Lab Results   Component Value Date    URICACID 5.4 04/01/2025    URICACID 8.1 (H) 02/17/2025    URICACID 8 10/31/2024

## 2025-05-21 NOTE — ASSESSMENT & PLAN NOTE
Lab Results   Component Value Date    HGBA1C 5.50 02/17/2025    HGBA1C 6.20 (H) 10/30/2019    CREATININE 1.16 02/17/2025    LDL 40 02/17/2025      Check A1c. If still pretty tight, I would recommend decreasing glimepiride. Discussed.

## 2025-05-21 NOTE — PROGRESS NOTES
J  U  N  O  H    K  I  M ,   M  D      I  N  T  E  R  N  A  L    M  E  D  I  C  I  N  E         ENCOUNTER DATE:  05/21/2025    Kevon Orantes / 74 y.o. / male    OFFICE VISIT ENCOUNTER       CHIEF COMPLAINT / REASON FOR OFFICE VISIT     Diabetes, Hypertension, Hyperlipidemia, and Gout      ASSESSMENT & PLAN     Problem List Items Addressed This Visit          High    Type 2 diabetes mellitus with stage 3a chronic kidney disease, without long-term current use of insulin (Chronic)    Current Assessment & Plan   Lab Results   Component Value Date    HGBA1C 5.50 02/17/2025    HGBA1C 6.20 (H) 10/30/2019    CREATININE 1.16 02/17/2025    LDL 40 02/17/2025      Check A1c. If still pretty tight, I would recommend decreasing glimepiride. Discussed.          Relevant Medications    metFORMIN (GLUCOPHAGE) 1000 MG tablet    glimepiride (AMARYL) 2 MG tablet    lisinopril (PRINIVIL,ZESTRIL) 20 MG tablet    Other Relevant Orders    Hemoglobin A1c    Microalbumin / Creatinine Urine Ratio - Urine, Clean Catch    Gout - Primary (Chronic)    Current Assessment & Plan   Doing well from gout standpoint and uric acid was down to 5.4.   Continue allopurinol 100 mg daily  May decrease colchicine to once weekly for a few weeks then take it only as needed. Discussed.     Lab Results   Component Value Date    URICACID 5.4 04/01/2025    URICACID 8.1 (H) 02/17/2025    URICACID 8 10/31/2024             Relevant Medications    allopurinol (ZYLOPRIM) 100 MG tablet    colchicine 0.6 MG tablet       Medium    Hypertension (Chronic)    Relevant Medications    lisinopril (PRINIVIL,ZESTRIL) 20 MG tablet    CAD (coronary artery disease) (Chronic)    Overview   Sees cardiology annually         Relevant Medications    isosorbide mononitrate (IMDUR) 30 MG 24 hr tablet    aspirin 81 MG EC tablet    atorvastatin (LIPITOR) 40 MG tablet    Hyperlipidemia (Chronic)    Relevant Medications    atorvastatin (LIPITOR) 40 MG tablet     Orders Placed This  "Encounter   Procedures    Pneumococcal Conjugate Vaccine 20-Valent All    Hemoglobin A1c     Release to patient:   Routine Release [[id number]]    Microalbumin / Creatinine Urine Ratio - Urine, Clean Catch     Release to patient:   Routine Release [[id number]]     No orders of the defined types were placed in this encounter.      SUMMARY/DISCUSSION        TOTAL TIME OF ENCOUNTER:      Next Appointment with me: Visit date not found    Return in about 5 months (around 10/21/2025) for **SCHEDULE COMBINED AWV AND MEDICAL F/U**.      VITAL SIGNS     Vitals:    05/21/25 0728   BP: 120/62   Pulse: 78   Temp: 96.9 °F (36.1 °C)   SpO2: 98%   Weight: 89.8 kg (198 lb)   Height: 182.9 cm (72\")       BP Readings from Last 3 Encounters:   05/21/25 120/62   02/17/25 142/84   11/07/24 110/70     Wt Readings from Last 3 Encounters:   05/21/25 89.8 kg (198 lb)   02/17/25 92.5 kg (204 lb)   11/07/24 94.3 kg (208 lb)     Body mass index is 26.85 kg/m².    Blood pressure readings recorded on patient flowsheet:       No data to display                MEDICATIONS AT THE TIME OF OFFICE VISIT     Current Outpatient Medications on File Prior to Visit   Medication Sig    allopurinol (ZYLOPRIM) 100 MG tablet Take 1 tablet by mouth Daily.    aspirin 81 MG EC tablet Take 1 tablet by mouth Daily. Indications: hx of CVA    atorvastatin (LIPITOR) 40 MG tablet Take 1 tablet by mouth Every Night. Indications: High Amount of Fats in the Blood    colchicine 0.6 MG tablet Take 1 tablet by mouth Daily As Needed for Muscle / Joint Pain (Gout). Indications: Gout    glimepiride (AMARYL) 2 MG tablet Take 1 tablet by mouth Every Morning Before Breakfast. Indications: Type 2 Diabetes    isosorbide mononitrate (IMDUR) 30 MG 24 hr tablet Take 1 tablet by mouth Every Morning. Indications: CAD/PVD    lisinopril (PRINIVIL,ZESTRIL) 20 MG tablet Take 1 tablet by mouth Daily. Indications: High Blood Pressure    metFORMIN (GLUCOPHAGE) 1000 MG tablet Take 1 tablet by " mouth 2 (Two) Times a Day. Indications: Type 2 Diabetes     No current facility-administered medications on file prior to visit.         HISTORY OF PRESENT ILLNESS     Gout:  Stable on allopurinol w/o flare-ups. Using significantly less colchicine. Last uric acid level:   Lab Results   Component Value Date    URICACID 5.4 04/01/2025      Chronic type 2 diabetes:  Diabetic complications: nephropathy/CKD. Current therapy: metformin/metformin ER and glimepiride.  Most recent change to treatment plan: no recent change.  Home blood sugar levels: does not check.   Most recent relevant labs:   Lab Results   Component Value Date    HGBA1C 5.50 02/17/2025    HGBA1C 6.20 (H) 10/30/2019    CREATININE 1.16 02/17/2025    LDL 40 02/17/2025      Chronic essential hypertension:  Since prior visit: compliant with medication(s), does not check blood pressure at home, and denies significant problems with medication(s).  Most recent in-office blood pressure readings:   BP Readings from Last 3 Encounters:   05/21/25 120/62   02/17/25 142/84   11/07/24 110/70      Chronic hyperlipidemia:  Current therapy include atorvastatin, denies problems with medication.    Most recent labs:   Lab Results   Component Value Date    LDL 40 02/17/2025    LDL 36.4 04/22/2024    LDL 25 07/25/2023    HDL 62 (H) 02/17/2025    HDL 52 04/22/2024    HDL 54 07/25/2023    TRIG 83 02/17/2025    CHOLHDLRATIO 1.90 02/17/2025    CHOLHDLRATIO 2.1 04/22/2024    CHOLHDLRATIO 1.9 07/25/2023     Coronary artery disease: He complains of no change in symptoms.   Denies exertional chest pain, LAMBERT, and heart palpitations. Current medications include aspirin, statin, and isosorbide.      Lab Results   Component Value Date    HGBA1C 5.50 02/17/2025    HGBA1C 6.20 (H) 10/30/2019    CREATININE 1.16 02/17/2025    LDL 40 02/17/2025     Blood sugar readings recorded on patient's flowsheet:       No data to display               89.8 kg (198 lb)    Patient Care Team:  Rustam Bailey,  "MD as PCP - General (Internal Medicine)  Rashid Lucas MD as Consulting Physician (Cardiology)  Jeet Banuelos MD as Consulting Physician (Ophthalmology)    REVIEW OF SYSTEMS           PHYSICAL EXAMINATION     Physical Exam  Feet:      Comments: Diabetic Foot Exam Performed and Monofilament Test Performed     Monofilament test is normal.       Alert with normal thought and judgment.   No edema of bilateral lower extremities   No active gout of his feet/ankle/toes      REVIEWED DATA     Labs:       Lab Results   Component Value Date     02/17/2025    K 4.4 02/17/2025    CALCIUM 9.6 02/17/2025    AST 15 02/17/2025    ALT 17 02/17/2025    BUN 22 02/17/2025    CREATININE 1.16 02/17/2025    CREATININE 0.94 10/22/2024    CREATININE 1.00 10/21/2024    EGFR 65.7 02/17/2025     Lab Results   Component Value Date    HGBA1C 5.50 02/17/2025    HGBA1C 6.20 (H) 10/30/2019   No results found for: \"MALBCRERATIO\"  Lab Results   Component Value Date    LDL 40 02/17/2025    LDL 36.4 04/22/2024    LDL 25 07/25/2023    HDL 62 (H) 02/17/2025    HDL 52 04/22/2024    TRIG 83 02/17/2025    CHOLHDLRATIO 1.90 02/17/2025     Lab Results   Component Value Date    TSH 2.130 10/30/2019     Lab Results   Component Value Date    WBC 9.30 02/17/2025    HGB 14.5 02/17/2025     02/17/2025       No results found for: \"TESTOSTERONE\", \"TESTOSTEROTT\", \"TESTFRE\"  Lab Results   Component Value Date    LDL 40 02/17/2025    HDL 62 (H) 02/17/2025    TRIG 83 02/17/2025    CHOLHDLRATIO 1.90 02/17/2025     Lab Results   Component Value Date    URICACID 5.4 04/01/2025    URICACID 8.1 (H) 02/17/2025    URICACID 8 10/31/2024        Imaging:           Medical Tests:           Summary of old records / correspondence / consultant report:           Request outside records:           "

## 2025-05-21 NOTE — LETTER
[facility]  Vaccine Consent Form    Patient Name:  Kevon Orantes  Patient :  1950     Vaccine(s) Ordered    Pneumococcal Conjugate Vaccine 20-Valent All        Screening Checklist  The following questions should be completed prior to vaccination. If you answer “yes” to any question, it does not necessarily mean you should not be vaccinated. It just means we may need to clarify or ask more questions. If a question is unclear, please ask your healthcare provider to explain it.    Yes No   Any fever or moderate to severe illness today (mild illness and/or antibiotic treatment are not contraindications)?     Do you have a history of a serious reaction to any previous vaccinations, such as anaphylaxis, encephalopathy within 7 days, Guillain-[place] syndrome within 6 weeks, seizure?     Have you received any live vaccine(s) (e.g MMR, EMILY) or any other vaccines in the last month (to ensure duplicate doses aren't given)?     Do you have an anaphylactic allergy to latex (DTaP, DTaP-IPV, Hep A, Hep B, MenB, RV, Td, Tdap), baker’s yeast (Hep B, HPV), polysorbates (RSV, nirsevimab, PCV 20, Rotavirrus, Tdap, Shingrix), or gelatin (EMILY, MMR)?     Do you have an anaphylactic allergy to neomycin (Rabies, EMILY, MMR, IPV, Hep A), polymyxin B (IPV), or streptomycin (IPV)?      Any cancer, leukemia, AIDS, or other immune system disorder? (EMILY, MMR, RV)     Do you have a parent, brother, or sister with an immune system problem (if immune competence of vaccine recipient clinically verified, can proceed)? (MMR, EIMLY)     Any recent steroid treatments for >2 weeks, chemotherapy, or radiation treatment? (EMILY, MMR)     Have you received antibody-containing blood transfusions or IVIG in the past 11 months (recommended interval is dependent on product)? (MMR, EMILY)     Have you taken antiviral drugs (acyclovir, famciclovir, valacyclovir for EMILY) in the last 24 or 48 hours, respectively?      Are you pregnant or planning to become  "pregnant within 1 month? (EMILY, MMR, HPV, IPV, MenB, Abrexvy; For Hep B- refer to Engerix-B; For RSV - Abrysvo is indicated for 32-36 weeks of pregnancy from September to January)     For infants, have you ever been told your child has had intussusception or a medical emergency involving obstruction of the intestine (Rotavirus)? If not for an infant, can skip this question.         *Ordering Physicians/APC should be consulted if \"yes\" is checked by the patient or guardian above.  I have received, read, and understand the Vaccine Information Statement (VIS) for each vaccine ordered.  I have considered my or my child's health status as well as the health status of my close contacts.  I have taken the opportunity to discuss my vaccine questions with my or my child's health care provider.   I have requested that the ordered vaccine(s) be given to me or my child.  I understand the benefits and risks of the vaccines.  I understand that I should remain in the clinic for 15 minutes after receiving the vaccine(s).  _________________________________________________________  Signature of Patient or Parent/Legal Guardian ____________________  Date     "

## 2025-05-22 DIAGNOSIS — M1A.9XX0 CHRONIC GOUT WITHOUT TOPHUS, UNSPECIFIED CAUSE, UNSPECIFIED SITE: Primary | ICD-10-CM

## 2025-05-22 RX ORDER — COLCHICINE 0.6 MG/1
0.6 TABLET ORAL DAILY PRN
Qty: 90 TABLET | Refills: 0 | Status: SHIPPED | OUTPATIENT
Start: 2025-05-22

## 2025-05-22 NOTE — TELEPHONE ENCOUNTER
Spoke with DR Banuelos office will fax over initial eye exam pt was seen 02/25 . Pt also req refills on Colchine

## 2025-05-22 NOTE — TELEPHONE ENCOUNTER
----- Message from Rustam Bailey sent at 5/21/2025  8:12 AM EDT -----  Regarding: Obtain diabetic eye exam report from eye doctor  IMPORTANT:  Obtain diabetic eye exam report and scan into chart.

## 2025-06-02 DIAGNOSIS — I10 PRIMARY HYPERTENSION: ICD-10-CM

## 2025-06-02 DIAGNOSIS — N18.31 TYPE 2 DIABETES MELLITUS WITH STAGE 3A CHRONIC KIDNEY DISEASE, WITHOUT LONG-TERM CURRENT USE OF INSULIN: Chronic | ICD-10-CM

## 2025-06-02 DIAGNOSIS — E11.22 TYPE 2 DIABETES MELLITUS WITH STAGE 3A CHRONIC KIDNEY DISEASE, WITHOUT LONG-TERM CURRENT USE OF INSULIN: Chronic | ICD-10-CM

## 2025-06-03 RX ORDER — LISINOPRIL 20 MG/1
20 TABLET ORAL DAILY
Qty: 90 TABLET | Refills: 1 | Status: SHIPPED | OUTPATIENT
Start: 2025-06-03

## 2025-08-17 DIAGNOSIS — N18.31 TYPE 2 DIABETES MELLITUS WITH STAGE 3A CHRONIC KIDNEY DISEASE, WITHOUT LONG-TERM CURRENT USE OF INSULIN: Chronic | ICD-10-CM

## 2025-08-17 DIAGNOSIS — E11.22 TYPE 2 DIABETES MELLITUS WITH STAGE 3A CHRONIC KIDNEY DISEASE, WITHOUT LONG-TERM CURRENT USE OF INSULIN: Chronic | ICD-10-CM

## 2025-08-19 RX ORDER — GLIMEPIRIDE 2 MG/1
2 TABLET ORAL
Qty: 90 TABLET | Refills: 1 | Status: SHIPPED | OUTPATIENT
Start: 2025-08-19

## 2025-08-28 DIAGNOSIS — M1A.9XX0 CHRONIC GOUT WITHOUT TOPHUS, UNSPECIFIED CAUSE, UNSPECIFIED SITE: ICD-10-CM

## 2025-08-28 RX ORDER — ALLOPURINOL 100 MG/1
100 TABLET ORAL DAILY
Qty: 90 TABLET | Refills: 1 | Status: SHIPPED | OUTPATIENT
Start: 2025-08-28

## (undated) DEVICE — BLCK/BITE BLOX W/DENTL/RIM W/STRAP 54F

## (undated) DEVICE — SENSR O2 OXIMAX FNGR A/ 18IN NONSTR

## (undated) DEVICE — Device: Brand: DEFENDO AIR/WATER/SUCTION AND BIOPSY VALVE

## (undated) DEVICE — ADAPT CLN BIOGUARD AIR/H2O DISP

## (undated) DEVICE — CANN NASL CO2 TRULINK W/O2 A/

## (undated) DEVICE — CVR PROB 96IN LF STRL

## (undated) DEVICE — TUBING, SUCTION, 1/4" X 10', STRAIGHT: Brand: MEDLINE

## (undated) DEVICE — DRSNG SURESITE WNDW 2.38X2.75

## (undated) DEVICE — BG WAST DISPOSABLE DEPOT W/TBG48IN S/COCK SPK1400

## (undated) DEVICE — CANN O2 ETCO2 FITS ALL CONN CO2 SMPL A/ 7IN DISP LF

## (undated) DEVICE — ANGIO-SEAL VIP VASCULAR CLOSURE DEVICE: Brand: ANGIO-SEAL

## (undated) DEVICE — KT ORCA ORCAPOD DISP STRL

## (undated) DEVICE — FRCP BX RADJAW4 NDL 2.8 240CM LG OG BX40

## (undated) DEVICE — SPNG GZ WOVN 4X4IN 12PLY 10/BX STRL

## (undated) DEVICE — 0.2 MICRON INTRAVENOUS FILTER FOR 96 HOUR USE WITH MICRO-BORE EXTENSION TUBING AN LUER-LOCK ADAPTER: Brand: PALL POSIDYNE® ELD FILTER

## (undated) DEVICE — PK ANGIO CERBRL RAD 40

## (undated) DEVICE — GLV SURG SENSICARE MICRO PF LF 7.5 STRL

## (undated) DEVICE — THE TORRENT IRRIGATION SCOPE CONNECTOR IS USED WITH THE TORRENT IRRIGATION TUBING TO PROVIDE IRRIGATION FLUIDS SUCH AS STERILE WATER DURING GASTROINTESTINAL ENDOSCOPIC PROCEDURES WHEN USED IN CONJUNCTION WITH AN IRRIGATION PUMP (OR ELECTROSURGICAL UNIT).: Brand: TORRENT

## (undated) DEVICE — CATH TEMPO 5F VER 135 Â° 100CM: Brand: TEMPO

## (undated) DEVICE — PINNACLE INTRODUCER SHEATH: Brand: PINNACLE

## (undated) DEVICE — SOL NACL 0.9PCT 1000ML

## (undated) DEVICE — RADIFOCUS GLIDEWIRE: Brand: GLIDEWIRE

## (undated) DEVICE — MANIFLD BLCK 200PSI 2PORT OFF RT

## (undated) DEVICE — RADIFOCUS TORQUE DEVICE MULTI-TORQUE VISE: Brand: RADIFOCUS TORQUE DEVICE

## (undated) DEVICE — APPL CHLORAPREP W/TINT 26ML ORNG

## (undated) DEVICE — ADAPT Y ROT GATEWAY PLS

## (undated) DEVICE — LN SMPL CO2 SHTRM SD STREAM W/M LUER

## (undated) DEVICE — ST ACC MICROPUNCTURE STFF .018 ECHO/PLDM/TP 4F/10CM 21G/7CM